# Patient Record
Sex: FEMALE | Race: WHITE | NOT HISPANIC OR LATINO | URBAN - METROPOLITAN AREA
[De-identification: names, ages, dates, MRNs, and addresses within clinical notes are randomized per-mention and may not be internally consistent; named-entity substitution may affect disease eponyms.]

---

## 2017-09-18 ENCOUNTER — INPATIENT (INPATIENT)
Facility: HOSPITAL | Age: 22
LOS: 3 days | Discharge: PSYCHIATRIC OR SUBSTANCE USE FACILITY | End: 2017-09-22
Attending: HOSPITALIST | Admitting: HOSPITALIST
Payer: COMMERCIAL

## 2017-09-18 VITALS
WEIGHT: 178.57 LBS | OXYGEN SATURATION: 100 % | DIASTOLIC BLOOD PRESSURE: 81 MMHG | TEMPERATURE: 98 F | SYSTOLIC BLOOD PRESSURE: 115 MMHG | HEART RATE: 89 BPM | RESPIRATION RATE: 19 BRPM

## 2017-09-18 DIAGNOSIS — Z29.9 ENCOUNTER FOR PROPHYLACTIC MEASURES, UNSPECIFIED: ICD-10-CM

## 2017-09-18 DIAGNOSIS — R11.10 VOMITING, UNSPECIFIED: ICD-10-CM

## 2017-09-18 DIAGNOSIS — F60.3 BORDERLINE PERSONALITY DISORDER: ICD-10-CM

## 2017-09-18 DIAGNOSIS — F31.9 BIPOLAR DISORDER, UNSPECIFIED: ICD-10-CM

## 2017-09-18 DIAGNOSIS — Z90.89 ACQUIRED ABSENCE OF OTHER ORGANS: Chronic | ICD-10-CM

## 2017-09-18 DIAGNOSIS — T39.1X2A POISONING BY 4-AMINOPHENOL DERIVATIVES, INTENTIONAL SELF-HARM, INITIAL ENCOUNTER: ICD-10-CM

## 2017-09-18 DIAGNOSIS — T50.902A POISONING BY UNSPECIFIED DRUGS, MEDICAMENTS AND BIOLOGICAL SUBSTANCES, INTENTIONAL SELF-HARM, INITIAL ENCOUNTER: ICD-10-CM

## 2017-09-18 LAB
ALBUMIN SERPL ELPH-MCNC: 4.5 G/DL — SIGNIFICANT CHANGE UP (ref 3.3–5)
ALP SERPL-CCNC: 66 U/L — SIGNIFICANT CHANGE UP (ref 40–120)
ALT FLD-CCNC: 60 U/L — HIGH (ref 4–33)
AMPHET UR-MCNC: NEGATIVE — SIGNIFICANT CHANGE UP
APAP SERPL-MCNC: 38 UG/ML — HIGH (ref 15–25)
AST SERPL-CCNC: 54 U/L — HIGH (ref 4–32)
BARBITURATES MEASUREMENT: NEGATIVE — SIGNIFICANT CHANGE UP
BARBITURATES UR SCN-MCNC: NEGATIVE — SIGNIFICANT CHANGE UP
BASE EXCESS BLDV CALC-SCNC: -1.3 MMOL/L — SIGNIFICANT CHANGE UP
BASOPHILS # BLD AUTO: 0.06 K/UL — SIGNIFICANT CHANGE UP (ref 0–0.2)
BASOPHILS NFR BLD AUTO: 0.4 % — SIGNIFICANT CHANGE UP (ref 0–2)
BENZODIAZ SERPL-MCNC: NEGATIVE — SIGNIFICANT CHANGE UP
BENZODIAZ UR-MCNC: POSITIVE — SIGNIFICANT CHANGE UP
BILIRUB SERPL-MCNC: 0.6 MG/DL — SIGNIFICANT CHANGE UP (ref 0.2–1.2)
BLOOD GAS VENOUS - CREATININE: 0.79 MG/DL — SIGNIFICANT CHANGE UP (ref 0.5–1.3)
BUN SERPL-MCNC: 13 MG/DL — SIGNIFICANT CHANGE UP (ref 7–23)
CALCIUM SERPL-MCNC: 9.2 MG/DL — SIGNIFICANT CHANGE UP (ref 8.4–10.5)
CANNABINOIDS UR-MCNC: POSITIVE — SIGNIFICANT CHANGE UP
CHLORIDE BLDV-SCNC: 107 MMOL/L — SIGNIFICANT CHANGE UP (ref 96–108)
CHLORIDE SERPL-SCNC: 104 MMOL/L — SIGNIFICANT CHANGE UP (ref 98–107)
CK SERPL-CCNC: 219 U/L — HIGH (ref 25–170)
CO2 SERPL-SCNC: 22 MMOL/L — SIGNIFICANT CHANGE UP (ref 22–31)
COCAINE METAB.OTHER UR-MCNC: NEGATIVE — SIGNIFICANT CHANGE UP
CREAT SERPL-MCNC: 0.91 MG/DL — SIGNIFICANT CHANGE UP (ref 0.5–1.3)
EOSINOPHIL # BLD AUTO: 0 K/UL — SIGNIFICANT CHANGE UP (ref 0–0.5)
EOSINOPHIL NFR BLD AUTO: 0 % — SIGNIFICANT CHANGE UP (ref 0–6)
ETHANOL BLD-MCNC: < 10 MG/DL — SIGNIFICANT CHANGE UP
GAS PNL BLDV: 135 MMOL/L — LOW (ref 136–146)
GLUCOSE BLDV-MCNC: 147 — HIGH (ref 70–99)
GLUCOSE SERPL-MCNC: 144 MG/DL — HIGH (ref 70–99)
HCG SERPL-ACNC: < 5 MIU/ML — SIGNIFICANT CHANGE UP
HCO3 BLDV-SCNC: 22 MMOL/L — SIGNIFICANT CHANGE UP (ref 20–27)
HCT VFR BLD CALC: 44.9 % — SIGNIFICANT CHANGE UP (ref 34.5–45)
HCT VFR BLDV CALC: 48.5 % — HIGH (ref 34.5–45)
HGB BLD-MCNC: 15.2 G/DL — SIGNIFICANT CHANGE UP (ref 11.5–15.5)
HGB BLDV-MCNC: 15.9 G/DL — HIGH (ref 11.5–15.5)
IMM GRANULOCYTES # BLD AUTO: 0.04 # — SIGNIFICANT CHANGE UP
IMM GRANULOCYTES NFR BLD AUTO: 0.3 % — SIGNIFICANT CHANGE UP (ref 0–1.5)
LACTATE BLDV-MCNC: 2 MMOL/L — SIGNIFICANT CHANGE UP (ref 0.5–2)
LYMPHOCYTES # BLD AUTO: 0.94 K/UL — LOW (ref 1–3.3)
LYMPHOCYTES # BLD AUTO: 6.4 % — LOW (ref 13–44)
MCHC RBC-ENTMCNC: 28.1 PG — SIGNIFICANT CHANGE UP (ref 27–34)
MCHC RBC-ENTMCNC: 33.9 % — SIGNIFICANT CHANGE UP (ref 32–36)
MCV RBC AUTO: 83 FL — SIGNIFICANT CHANGE UP (ref 80–100)
METHADONE UR-MCNC: NEGATIVE — SIGNIFICANT CHANGE UP
MONOCYTES # BLD AUTO: 0.5 K/UL — SIGNIFICANT CHANGE UP (ref 0–0.9)
MONOCYTES NFR BLD AUTO: 3.4 % — SIGNIFICANT CHANGE UP (ref 2–14)
NEUTROPHILS # BLD AUTO: 13.12 K/UL — HIGH (ref 1.8–7.4)
NEUTROPHILS NFR BLD AUTO: 89.5 % — HIGH (ref 43–77)
NRBC # FLD: 0 — SIGNIFICANT CHANGE UP
OPIATES UR-MCNC: NEGATIVE — SIGNIFICANT CHANGE UP
OXYCODONE UR-MCNC: NEGATIVE — SIGNIFICANT CHANGE UP
PCO2 BLDV: 45 MMHG — SIGNIFICANT CHANGE UP (ref 41–51)
PCP UR-MCNC: NEGATIVE — SIGNIFICANT CHANGE UP
PH BLDV: 7.34 PH — SIGNIFICANT CHANGE UP (ref 7.32–7.43)
PLATELET # BLD AUTO: 279 K/UL — SIGNIFICANT CHANGE UP (ref 150–400)
PMV BLD: 10.9 FL — SIGNIFICANT CHANGE UP (ref 7–13)
PO2 BLDV: 34 MMHG — LOW (ref 35–40)
POTASSIUM BLDV-SCNC: 5.2 MMOL/L — HIGH (ref 3.4–4.5)
POTASSIUM SERPL-MCNC: 3.8 MMOL/L — SIGNIFICANT CHANGE UP (ref 3.5–5.3)
POTASSIUM SERPL-SCNC: 3.8 MMOL/L — SIGNIFICANT CHANGE UP (ref 3.5–5.3)
PROT SERPL-MCNC: 8.1 G/DL — SIGNIFICANT CHANGE UP (ref 6–8.3)
RBC # BLD: 5.41 M/UL — HIGH (ref 3.8–5.2)
RBC # FLD: 13 % — SIGNIFICANT CHANGE UP (ref 10.3–14.5)
SALICYLATES SERPL-MCNC: < 5 MG/DL — LOW (ref 15–30)
SAO2 % BLDV: 67 % — SIGNIFICANT CHANGE UP (ref 60–85)
SODIUM SERPL-SCNC: 142 MMOL/L — SIGNIFICANT CHANGE UP (ref 135–145)
WBC # BLD: 14.66 K/UL — HIGH (ref 3.8–10.5)
WBC # FLD AUTO: 14.66 K/UL — HIGH (ref 3.8–10.5)

## 2017-09-18 PROCEDURE — 99223 1ST HOSP IP/OBS HIGH 75: CPT | Mod: AI,GC

## 2017-09-18 RX ORDER — SODIUM CHLORIDE 9 MG/ML
1000 INJECTION INTRAMUSCULAR; INTRAVENOUS; SUBCUTANEOUS ONCE
Qty: 0 | Refills: 0 | Status: COMPLETED | OUTPATIENT
Start: 2017-09-18 | End: 2017-09-18

## 2017-09-18 RX ORDER — INFLUENZA VIRUS VACCINE 15; 15; 15; 15 UG/.5ML; UG/.5ML; UG/.5ML; UG/.5ML
0.5 SUSPENSION INTRAMUSCULAR ONCE
Qty: 0 | Refills: 0 | Status: DISCONTINUED | OUTPATIENT
Start: 2017-09-18 | End: 2017-09-22

## 2017-09-18 RX ORDER — SODIUM CHLORIDE 9 MG/ML
1000 INJECTION INTRAMUSCULAR; INTRAVENOUS; SUBCUTANEOUS
Qty: 0 | Refills: 0 | Status: COMPLETED | OUTPATIENT
Start: 2017-09-18 | End: 2017-09-19

## 2017-09-18 RX ORDER — ACETYLCYSTEINE 200 MG/ML
4.1 VIAL (ML) MISCELLANEOUS ONCE
Qty: 0 | Refills: 0 | Status: COMPLETED | OUTPATIENT
Start: 2017-09-18 | End: 2017-09-18

## 2017-09-18 RX ORDER — FAMOTIDINE 10 MG/ML
20 INJECTION INTRAVENOUS ONCE
Qty: 0 | Refills: 0 | Status: COMPLETED | OUTPATIENT
Start: 2017-09-18 | End: 2017-09-18

## 2017-09-18 RX ORDER — CLONAZEPAM 1 MG
1 TABLET ORAL
Qty: 0 | Refills: 0 | Status: DISCONTINUED | OUTPATIENT
Start: 2017-09-18 | End: 2017-09-22

## 2017-09-18 RX ORDER — ONDANSETRON 8 MG/1
4 TABLET, FILM COATED ORAL ONCE
Qty: 0 | Refills: 0 | Status: COMPLETED | OUTPATIENT
Start: 2017-09-18 | End: 2017-09-18

## 2017-09-18 RX ORDER — ACETYLCYSTEINE 200 MG/ML
12 VIAL (ML) MISCELLANEOUS ONCE
Qty: 0 | Refills: 0 | Status: COMPLETED | OUTPATIENT
Start: 2017-09-18 | End: 2017-09-18

## 2017-09-18 RX ORDER — PANTOPRAZOLE SODIUM 20 MG/1
40 TABLET, DELAYED RELEASE ORAL DAILY
Qty: 0 | Refills: 0 | Status: DISCONTINUED | OUTPATIENT
Start: 2017-09-18 | End: 2017-09-22

## 2017-09-18 RX ORDER — ONDANSETRON 8 MG/1
4 TABLET, FILM COATED ORAL EVERY 8 HOURS
Qty: 0 | Refills: 0 | Status: DISCONTINUED | OUTPATIENT
Start: 2017-09-18 | End: 2017-09-22

## 2017-09-18 RX ORDER — ACETYLCYSTEINE 200 MG/ML
8 VIAL (ML) MISCELLANEOUS ONCE
Qty: 0 | Refills: 0 | Status: COMPLETED | OUTPATIENT
Start: 2017-09-18 | End: 2017-09-18

## 2017-09-18 RX ADMIN — ONDANSETRON 4 MILLIGRAM(S): 8 TABLET, FILM COATED ORAL at 16:56

## 2017-09-18 RX ADMIN — Medication 30 MILLILITER(S): at 21:18

## 2017-09-18 RX ADMIN — Medication 310 GRAM(S): at 17:17

## 2017-09-18 RX ADMIN — FAMOTIDINE 20 MILLIGRAM(S): 10 INJECTION INTRAVENOUS at 16:56

## 2017-09-18 RX ADMIN — Medication 130.13 GRAM(S): at 18:15

## 2017-09-18 RX ADMIN — SODIUM CHLORIDE 1000 MILLILITER(S): 9 INJECTION INTRAMUSCULAR; INTRAVENOUS; SUBCUTANEOUS at 16:55

## 2017-09-18 RX ADMIN — ONDANSETRON 4 MILLIGRAM(S): 8 TABLET, FILM COATED ORAL at 22:51

## 2017-09-18 RX ADMIN — Medication 65 GRAM(S): at 22:45

## 2017-09-18 NOTE — ED ADULT NURSE NOTE - CHIEF COMPLAINT QUOTE
Hx of borderline personality disorder. Pt states she took ~60 Tylenol PM last night at 7:30pm. Pt has been vomiting. Parents state she is more lethargic than normal, but awake and alert in triage. Hx of suicide attempts in past and has had an increase in depression. Hx of using prescription drugs, family wants pt to go to rehab but will need detox first.

## 2017-09-18 NOTE — ED PROVIDER NOTE - PROGRESS NOTE DETAILS
Mac Barrett: spoke with ED BH attending, will consult on patient. ISAURA Barrett: pt doing well, no acute complaints. spoke with tox fellow and attending rain-- advised NAC x 3 doses and trend LFTS. will follow. admitted to hospitalist.

## 2017-09-18 NOTE — CONSULT NOTE ADULT - PROBLEM SELECTOR RECOMMENDATION 9
1) Intentional acute APAP OD with level above the [place]-Jose Carlos Nomogram line. Tx with IV NAC, weight based dosing: 1) Intentional acute APAP OD with level above the [place]-Jose Carlos Nomogram line. Tx with IV NAC, weight based dosing:  -150mg/kg IV over 1 hr  -50mg/kg IV over 4 hrs  -100mg/kg over 16 hrs  2) Check LFTs, coags, lactate  3) Repeat LFTs, APAP at hour 20 and please page with results  4) No s/sx of anticholinergic toxicity or serotonin syndrome  5) Psych consult  6) Will follow. Please call [phone] with consult

## 2017-09-18 NOTE — H&P ADULT - NSHPREVIEWOFSYSTEMS_GEN_ALL_CORE
REVIEW OF SYSTEMS  General:	No fever, no weight loss  Skin/Breast: No rash  Ophthalmologic: Yellow spots in vision  ENMT: Throat discomfort	  Respiratory and Thorax: Non-productive cough  Cardiovascular: Central chest pain, mild, burning, no radiation to arm or jaw, non-exertional	  Gastrointestinal: NB/NB emesis, one episode of red streaked emesis in ED	  Genitourinary: No dysuria/hematuria		  Neurological: +Headache	  Psychiatric: -SI/AH/VH

## 2017-09-18 NOTE — ED PROVIDER NOTE - CHPI ED SYMPTOMS NEG
no change in level of consciousness/no paranoia/no hallucinations/no homicidal/no weakness/no confusion/no disorientation/no weight loss/no agitation

## 2017-09-18 NOTE — H&P ADULT - NSHPLABSRESULTS_GEN_ALL_CORE
Complete Blood Count + Automated Diff (09.18.17 @ 17:03)    Nucleated RBC #: 0    WBC Count: 14.66 K/uL    RBC Count: 5.41 M/uL    Hemoglobin: 15.2 g/dL    Hematocrit: 44.9 %    Mean Cell Volume: 83.0 fL    Mean Cell Hemoglobin: 28.1 pg    Mean Cell Hemoglobin Conc: 33.9 %    Red Cell Distrib Width: 13.0 %    Platelet Count - Automated: 279 K/uL    MPV: 10.9 fl    Auto Neutrophil #: 13.12 K/uL    Auto Lymphocyte #: 0.94 K/uL    Auto Monocyte #: 0.50 K/uL    Auto Eosinophil #: 0.00 K/uL    Auto Basophil #: 0.06 K/uL    Auto Immature Granulocyte #: 0.04    Auto Neutrophil %: 89.5 %    Auto Lymphocyte %: 6.4 %    Auto Monocyte %: 3.4 %    Auto Eosinophil %: 0.0 %    Auto Basophil %: 0.4 %    Auto Immature Granulocyte %: 0.3    Comprehensive Metabolic Panel (09.18.17 @ 17:03)    Sodium, Serum: 142 mmol/L    Potassium, Serum: 3.8 mmol/L    Chloride, Serum: 104 mmol/L    Carbon Dioxide, Serum: 22 mmol/L    Blood Urea Nitrogen, Serum: 13 mg/dL    Creatinine, Serum: 0.91 mg/dL    Glucose, Serum: 144 mg/dL    Calcium, Total Serum: 9.2 mg/dL    Protein Total, Serum: 8.1 g/dL    Albumin, Serum: 4.5 g/dL    Bilirubin Total, Serum: 0.6 mg/dL    Alkaline Phosphatase, Serum: 66    Aspartate Aminotransferase (AST/SGOT): 54 u/L    Alanine Aminotransferase (ALT/SGPT): 60 u/L    Acetaminophen Level, Serum: 38.o

## 2017-09-18 NOTE — ED PROVIDER NOTE - OBJECTIVE STATEMENT
23 yo F pmhx of [facility] personality, depression, anger, here for tylenol OD. Pt states last night she took 60-70 tablets of tylenol PM. Began vomiting after and reports multiple episodes of emesis overnight and this morning, last being 10 minute PTA. Also reports mild epigastric pain, diarrhea. All non bloody. Pt states when she took the medication she did it with the intention to kill herself, but now she states she regrets it and does not want to kill herself. Reports that she has support with her girlfriend and her dogs at home as positive value that don't make her want to kill herself. Denies HI. Reports she used to take lithium which helped but stopped 1 mo ago after switching psychiatrists and is now only on klonopin which she feels doesn't work as well. Denies fever chills CP SOB weakness HA dizziness rash.

## 2017-09-18 NOTE — H&P ADULT - NSHPSOCIALHISTORY_GEN_ALL_CORE
Lives with mother in [place], feels safe in home. 7 pack/year current smoker, denies EtOH/IVDU, endorses occassional marijuana.

## 2017-09-18 NOTE — H&P ADULT - NSHPPHYSICALEXAM_GEN_ALL_CORE
VITAL SIGNS:  T(C): 36.7 (09-18-17 @ 16:38), Max: 36.7 (09-18-17 @ 15:56)  T(F): 98.1 (09-18-17 @ 16:38), Max: 98.1 (09-18-17 @ 16:38)  HR: 68 (09-18-17 @ 18:44) (68 - 89)  BP: 111/61 (09-18-17 @ 18:44) (111/61 - 115/81)  BP(mean): --  RR: 17 (09-18-17 @ 18:44) (16 - 19)  SpO2: 100% (09-18-17 @ 18:44) (99% - 100%)  Wt(kg): --    PHYSICAL EXAM:    Constitutional: WDWN resting comfortably in bed; NAD  Head: NC/AT  Eyes: PERRL  ENT: Erythematous pharynx,  uvula midline, MMM. no nasal discharge  Respiratory: CTA B/L; no W/R/R, no retractions  Cardiac: +S1/S2; RRR; no M/R/G; PMI non-displaced  Gastrointestinal: soft, NT/ND; no rebound or guarding; +BSx4  Extremities: WWP; no peripheral edema  Vascular: 2+ radial pulses B/L  Dermatologic: skin warm, dry and intact; no rashes  Neurologic: AAOx3; CNII-XII grossly intact; moves all extremity well, sensation intact and symmetric  Psychiatric: Mood "garbage". Affect dysthymic. Speech regular rate. Content of thought: Denies SI/AH/VH. Flow of thought is logical.

## 2017-09-18 NOTE — H&P ADULT - ASSESSMENT
21 YO F with history of borderline personality disorder and bipolar disorder presents after intentional tylenol overdose. Now denying SI. Tylenol level of 38 at approximately 20 hours is well above treatment cutoff on Tylenol nomogram; NAC was initiated in ED.

## 2017-09-18 NOTE — ED PROVIDER NOTE - ATTENDING CONTRIBUTION TO CARE
22 year old presents w overdose of tylenol pm.  Took 60 tablets.  Pt has had multiple suicide attempts.   Pt was given NAC IV and admitted for tylenol over dose.  PT lungs cta b/l abd nt/nd neuro nonfocal exam  Impression Overdose, Depression.

## 2017-09-18 NOTE — H&P ADULT - HISTORY OF PRESENT ILLNESS
21 YO F with history of [place], bipolar currently treated with clonazepam presents with report of tylenol ingestion. She reports that at approximately 20:30 yesterday evening (20 hours prior to presentation) she finished a bottle of tylenol that contained approximately 100 pills. She has had several life stressors recently but had been feeling well until yesterday when a close friend was arrested which she reports 21 YO F with history of [place] [employer], bipolar currently treated with clonazepam presents with report of tylenol ingestion. She reports that at approximately 20:30 yesterday evening (20 hours prior to presentation) she finished a bottle of tylenol that contained approximately 100 pills. She has had several life stressors recently but had been feeling well until yesterday when a close friend was arrested which she reports precipitated her suicide attempt. Following her ingestion she was initially asymptomatic but began experiencing RUQ pain and N/V this morning. The emesis was initially nonbloody-nonbilious but the most recent episode has some faint red streaks. She also reports non-bloody diarrhea and throat discomfort following this ingestion. At the time of interview she denied active or passive SI and hallucinations. She denies any other toxic ingestion.    In the ED vitals were: 36.7 89 115/81 19 100%, a tylenol level was drawn at 17:00 with a level of 38, remainder of toxicology work up was negative. She was seen by toxicology who initiated NAC.

## 2017-09-18 NOTE — H&P ADULT - PROBLEM SELECTOR PLAN 2
Likely 2/2 tylenol ingestion, although patient reports some blood tinge to emesis this was only on episode and after extensive vomiting. Patient remains VS stable with normal Hgb and only mildly elevated LFTs.  - 100 ml/hr NS for 12 hr overnight  - Protonix 40 mg PO daily, zofran 4 mg PO daily PRN Likely 2/2 tylenol ingestion, although patient reports some blood tinge to emesis this was only on episode and after extensive vomiting. Patient remains VS stable with normal Hgb and only mildly elevated LFTs.  - 100 ml/hr NS for 12 hr overnight  - Protonix 40 mg PO daily, zofran 4 mg PO q8 PRN

## 2017-09-18 NOTE — ED ADULT TRIAGE NOTE - CHIEF COMPLAINT QUOTE
Hx of borderline personality disorder. Pt states she took ~60 Tylenol PM last night at 7:30pm. Pt has been vomiting. Parents state she is more letahrgic than normal. Hx of suicide attempts in past. Family wants pt to go to rehab but will need detox first. Hx of borderline personality disorder. Pt states she took ~60 Tylenol PM last night at 7:30pm. Pt has been vomiting. Parents state she is more lethargic than normal, but awake and alert in triage. Hx of suicide attempts in past and has had an increase in depression. Hx of using prescription drugs, family wants pt to go to rehab but will need detox first.

## 2017-09-18 NOTE — ED ADULT NURSE NOTE - OBJECTIVE STATEMENT
Pt received to rm #7.  appears generally weak. pale.  oriented x 3.  calm and cooperative at this time. admits to taking a large amt (about 60-70 pills)  of Tylenol pm pills last night around 9pm to hurt herself.  seen and eval'd by md.  SL placed. labs sent.  VSS.  ekg called.  1:1 observation requested. pts family at bedside at this time. resting.  await further orders.  report to primary rn.  add: multiple old (4 years old) cut marks/scars noted up and down  left arm. Pt received to rm #7.  appears generally weak. pale.  oriented x 3.  calm and cooperative at this time. admits to taking a large amt (about 60-70 pills)  of Tylenol pm pills last night around 9pm to hurt herself.  seen and eval'd by md.  SL placed. labs sent.  VSS.  ekg called.  1:1 observation initiated. pts family at bedside at this time. resting.  await further orders.  report to primary rn.  add: multiple old (4 years old) cut marks/scars noted up and down  left arm. Pt received to rm #7.  appears generally weak. pale.  oriented x 3.  calm and cooperative at this time. admits to taking a large amt (about 60-70 pills)  of Tylenol pm pills last night around 9pm to hurt herself.  seen and eval'd by md.  SL placed. labs sent.  VSS.  ekg called.  1:1 observation initiated. pts family at bedside at this time. resting.  await further orders.  report to primary rn.  add: multiple old (4 years old) cut marks/scars noted up and down  left arm.  Patient has MD at bedside. Pt is calm and has a 1:1 watching her. She has been assigned a room and report has been given. Pt is awaiting transport to floor.     CHENCHO Robertson

## 2017-09-18 NOTE — ED ADULT NURSE REASSESSMENT NOTE - NS ED NURSE REASSESS COMMENT FT1
Report received from CHENCHO Fletcher. Pt. is resting with family and PCA at bedside. Pt. remains on constant observation. Safe environment maintained. Pt. c/o epigastric burning pain. Medicine team paged. Awaiting response. ED attending at bedside at present. Report received from CHENCHO Fletcher. Report given by CHENCHO Jamison. Pt. is awaiting transport. Pt. is resting with family and PCA at bedside. Pt. remains on constant observation. Safe environment maintained. Pt. c/o epigastric burning pain. Medicine team paged. Awaiting response. ED attending at bedside at present.

## 2017-09-18 NOTE — CONSULT NOTE ADULT - SUBJECTIVE AND OBJECTIVE BOX
MEDICAL TOXICOLOGY CONSULT    HPI:  22F h/o borderline personality d/o, depression, on clonazepam, was on lithium (but dc'ed 1.5 mos ago) p/w intentional OD of "a handful" of Tylenol PM at 8.30-9pm last night. Pt did not tell her mother how much she took until today when she had multiple episodes of NBNB emesis. Also c/o RUQ pain, no fevers or chills, no constipation or diarrhea. +suicidal. No cp or sob. Able to urinate.    ONSET / TIME of exposure(s):  last night    QUANTITY of exposure(s): unknown amount of APAP PM    ROUTE of exposure:  x (INGESTION,     CONTEXT of exposure: depression    ASSOCIATED symptoms: N/V/abdo pain    PAST MEDICAL & SURGICAL HISTORY:  borderline personality, depression      MEDICATION HISTORY:  clonazepam, lithium      RECREATIONAL / ETHANOL / SUPPLEMENT USE: +tobacco, +THC    SOCIAL Hx:  lives with mother, works as a [employer] worker    FAMILY HISTORY: NC      REVIEW OF SYSTEMS:   _____unable to perform due to intoxication, dementia, or illness  General:  no fever, chills, malaise, change in weight or fatigue  Eyes:  no blurry vision, double vision, or diminished acuity  ENT:  no tinnitus, decreased acuity, epistaxis, sore throat, dysphagia  Cardiac: no chest pain, syncope, or palpitations  Lung:  no cough, shortness of breath, stridor, or wheezing  GI:  +abdo pain, nausea, vomiting  Genitourinary: no dysuria, hematuria, or incontinence  Ortho: no joint pain, swelling, myalgias, atrophy, or spasm  Skin: no rash, lesions, or pruritis  Neuro:  no headache, weakness/numbness, ataxia, change in speech, dizziness, tremor, or seizure  Psych: _x___depression, no____anxiety, _no___mania, __x___suicidal, ____homicidal  Endocrine: no polydypsia, polyuria, heat/cold intoleranceno  Hematologic: no bleeding, bruising, petechiae, or adenopathy  Immune:  no rhinitis, atopy, immunocompromise, HIV, or cancer    PHYSICAL EXAM  Vital Signs Last 24 Hrs  T(C): 36.7 (18 Sep 2017 16:38), Max: 36.7 (18 Sep 2017 15:56)  T(F): 98.1 (18 Sep 2017 16:38), Max: 98.1 (18 Sep 2017 16:38)  HR: 77 (18 Sep 2017 16:38) (77 - 89)  BP: 111/64 (18 Sep 2017 16:38) (111/64 - 115/81)  BP(mean): --  RR: 16 (18 Sep 2017 16:38) (16 - 19)  SpO2: 99% (18 Sep 2017 16:38) (99% - 100%)  General:    Head:  normocephalic & atraumatic  Eyes:  extra-occular movement is intact  Pupils:  2___ mm, symmetric, reactive to light  Ear, nose, throat:  mucosa is __moist__, dentition is _nml__  Neck:  supple  Respiratory:  __nml__ effort, clear to auscultation bilaterally, no rales/rhonchi/wheezing  Cardiac:  rate is__nml__, normal rhythm____, no rubs/murmurs/gallops  Abdomen:  +epigastric and RUQ ttp, neg murphys  :  deferred  Skin:  dry/diaphoretic, pink/flushed, turgor is_____  Neurologic:  _no__Clonus, _no__ Tremor, Reflexes are____nml_, extremities are supple, cranial nerves II-XII intact, Level of consciousness is nml____  Psychiatric:  Insight is__impaired__, alert and oriented x__3__, Memory is ___nml__, Affect is_____depressed    SIGNIFICANT LABORATORY STUDIES:                        15.2   14.66 )-----------( 279      ( 18 Sep 2017 17:03 )             44.9       18    142  |  104  |  13  ----------------------------<  144<H>  3.8   |  22  |  0.91    Ca    9.2      18 Sep 2017 17:03    TPro  8.1  /  Alb  4.5  /  TBili  0.6  /  DBili  x   /  AST  54<H>  /  ALT  60<H>  /  AlkPhos  66  18              Anion Gap: --  @ 17:03  CK: --  @ 17:03  Troponin:  --   @ 17:03  Pro-BNP:  --   @ 17:03  VB   @ 17:03  Carboxyhemoglobin %:  --   @ :03  Methemoglobin %:  --   @ 17:03  Osmolality Serum:  --   @ 17:03  Aspirin Level: < 5.0<L>   @ 17:03  Acetaminophen Level:  38.0<H>   @ 17:03  Ethanol Level:  < 10   @ 17:03  Digoxin Level:  --   @ 17:03  Phenytoin Level:  --   @ 17:03  Carbamazepine level:  --   @ 17:03  Lamotrigine level:  --   @ 17:03      ANION Gap  OSM Gap  CK  ASA  APAP  Ethanol  VBG  Carboxy/Met-hemoglobin %  Lactate  ___drug level    ECG:  rate, rhythm, NJ, QRS, QTc    RADIOLOGIC STUDIES

## 2017-09-19 DIAGNOSIS — G40.909 EPILEPSY, UNSPECIFIED, NOT INTRACTABLE, WITHOUT STATUS EPILEPTICUS: ICD-10-CM

## 2017-09-19 LAB
ALBUMIN SERPL ELPH-MCNC: 3.5 G/DL — SIGNIFICANT CHANGE UP (ref 3.3–5)
ALBUMIN SERPL ELPH-MCNC: 3.6 G/DL — SIGNIFICANT CHANGE UP (ref 3.3–5)
ALP SERPL-CCNC: 55 U/L — SIGNIFICANT CHANGE UP (ref 40–120)
ALP SERPL-CCNC: 55 U/L — SIGNIFICANT CHANGE UP (ref 40–120)
ALT FLD-CCNC: 167 U/L — HIGH (ref 4–33)
ALT FLD-CCNC: 822 U/L — HIGH (ref 4–33)
APAP SERPL-MCNC: < 15 UG/ML — LOW (ref 15–25)
APTT BLD: 29.6 SEC — SIGNIFICANT CHANGE UP (ref 27.5–37.4)
AST SERPL-CCNC: 133 U/L — HIGH (ref 4–32)
AST SERPL-CCNC: 818 U/L — HIGH (ref 4–32)
BASOPHILS # BLD AUTO: 0.1 K/UL — SIGNIFICANT CHANGE UP (ref 0–0.2)
BASOPHILS NFR BLD AUTO: 1.2 % — SIGNIFICANT CHANGE UP (ref 0–2)
BILIRUB DIRECT SERPL-MCNC: 0.1 MG/DL — SIGNIFICANT CHANGE UP (ref 0.1–0.2)
BILIRUB SERPL-MCNC: 0.4 MG/DL — SIGNIFICANT CHANGE UP (ref 0.2–1.2)
BILIRUB SERPL-MCNC: 0.5 MG/DL — SIGNIFICANT CHANGE UP (ref 0.2–1.2)
BUN SERPL-MCNC: 8 MG/DL — SIGNIFICANT CHANGE UP (ref 7–23)
CALCIUM SERPL-MCNC: 8.2 MG/DL — LOW (ref 8.4–10.5)
CHLORIDE SERPL-SCNC: 105 MMOL/L — SIGNIFICANT CHANGE UP (ref 98–107)
CO2 SERPL-SCNC: 20 MMOL/L — LOW (ref 22–31)
CREAT SERPL-MCNC: 0.7 MG/DL — SIGNIFICANT CHANGE UP (ref 0.5–1.3)
EOSINOPHIL # BLD AUTO: 0.11 K/UL — SIGNIFICANT CHANGE UP (ref 0–0.5)
EOSINOPHIL NFR BLD AUTO: 1.3 % — SIGNIFICANT CHANGE UP (ref 0–6)
GLUCOSE SERPL-MCNC: 110 MG/DL — HIGH (ref 70–99)
HCT VFR BLD CALC: 40 % — SIGNIFICANT CHANGE UP (ref 34.5–45)
HGB BLD-MCNC: 13.5 G/DL — SIGNIFICANT CHANGE UP (ref 11.5–15.5)
IMM GRANULOCYTES # BLD AUTO: 0.04 # — SIGNIFICANT CHANGE UP
IMM GRANULOCYTES NFR BLD AUTO: 0.5 % — SIGNIFICANT CHANGE UP (ref 0–1.5)
INR BLD: 1.65 — HIGH (ref 0.88–1.17)
INR BLD: 1.74 — HIGH (ref 0.88–1.17)
LACTATE SERPL-SCNC: 1.1 MMOL/L — SIGNIFICANT CHANGE UP (ref 0.5–2)
LYMPHOCYTES # BLD AUTO: 0.96 K/UL — LOW (ref 1–3.3)
LYMPHOCYTES # BLD AUTO: 11.3 % — LOW (ref 13–44)
MAGNESIUM SERPL-MCNC: 2.1 MG/DL — SIGNIFICANT CHANGE UP (ref 1.6–2.6)
MCHC RBC-ENTMCNC: 28 PG — SIGNIFICANT CHANGE UP (ref 27–34)
MCHC RBC-ENTMCNC: 33.8 % — SIGNIFICANT CHANGE UP (ref 32–36)
MCV RBC AUTO: 83 FL — SIGNIFICANT CHANGE UP (ref 80–100)
MONOCYTES # BLD AUTO: 0.35 K/UL — SIGNIFICANT CHANGE UP (ref 0–0.9)
MONOCYTES NFR BLD AUTO: 4.1 % — SIGNIFICANT CHANGE UP (ref 2–14)
NEUTROPHILS # BLD AUTO: 6.93 K/UL — SIGNIFICANT CHANGE UP (ref 1.8–7.4)
NEUTROPHILS NFR BLD AUTO: 81.6 % — HIGH (ref 43–77)
NRBC # FLD: 0 — SIGNIFICANT CHANGE UP
PHOSPHATE SERPL-MCNC: 1.9 MG/DL — LOW (ref 2.5–4.5)
PLATELET # BLD AUTO: 216 K/UL — SIGNIFICANT CHANGE UP (ref 150–400)
PMV BLD: 11.2 FL — SIGNIFICANT CHANGE UP (ref 7–13)
POTASSIUM SERPL-MCNC: 3.2 MMOL/L — LOW (ref 3.5–5.3)
POTASSIUM SERPL-SCNC: 3.2 MMOL/L — LOW (ref 3.5–5.3)
PROT SERPL-MCNC: 6.3 G/DL — SIGNIFICANT CHANGE UP (ref 6–8.3)
PROT SERPL-MCNC: 6.5 G/DL — SIGNIFICANT CHANGE UP (ref 6–8.3)
PROTHROM AB SERPL-ACNC: 18.7 SEC — HIGH (ref 9.8–13.1)
PROTHROM AB SERPL-ACNC: 19.7 SEC — HIGH (ref 9.8–13.1)
RBC # BLD: 4.82 M/UL — SIGNIFICANT CHANGE UP (ref 3.8–5.2)
RBC # FLD: 13.1 % — SIGNIFICANT CHANGE UP (ref 10.3–14.5)
SODIUM SERPL-SCNC: 140 MMOL/L — SIGNIFICANT CHANGE UP (ref 135–145)
WBC # BLD: 8.49 K/UL — SIGNIFICANT CHANGE UP (ref 3.8–10.5)
WBC # FLD AUTO: 8.49 K/UL — SIGNIFICANT CHANGE UP (ref 3.8–10.5)

## 2017-09-19 PROCEDURE — 99233 SBSQ HOSP IP/OBS HIGH 50: CPT | Mod: GC

## 2017-09-19 PROCEDURE — 99223 1ST HOSP IP/OBS HIGH 75: CPT

## 2017-09-19 PROCEDURE — 70450 CT HEAD/BRAIN W/O DYE: CPT | Mod: 26

## 2017-09-19 RX ORDER — SODIUM CHLORIDE 9 MG/ML
1000 INJECTION INTRAMUSCULAR; INTRAVENOUS; SUBCUTANEOUS
Qty: 0 | Refills: 0 | Status: DISCONTINUED | OUTPATIENT
Start: 2017-09-19 | End: 2017-09-22

## 2017-09-19 RX ORDER — NICOTINE POLACRILEX 2 MG
1 GUM BUCCAL DAILY
Qty: 0 | Refills: 0 | Status: DISCONTINUED | OUTPATIENT
Start: 2017-09-19 | End: 2017-09-22

## 2017-09-19 RX ORDER — CLONAZEPAM 1 MG
1 TABLET ORAL ONCE
Qty: 0 | Refills: 0 | Status: COMPLETED | OUTPATIENT
Start: 2017-09-19 | End: 2017-09-26

## 2017-09-19 RX ORDER — METOCLOPRAMIDE HCL 10 MG
5 TABLET ORAL EVERY 8 HOURS
Qty: 0 | Refills: 0 | Status: DISCONTINUED | OUTPATIENT
Start: 2017-09-19 | End: 2017-09-22

## 2017-09-19 RX ORDER — SODIUM,POTASSIUM PHOSPHATES 278-250MG
1 POWDER IN PACKET (EA) ORAL
Qty: 0 | Refills: 0 | Status: COMPLETED | OUTPATIENT
Start: 2017-09-19 | End: 2017-09-20

## 2017-09-19 RX ORDER — CLONAZEPAM 1 MG
1 TABLET ORAL ONCE
Qty: 0 | Refills: 0 | Status: DISCONTINUED | OUTPATIENT
Start: 2017-09-19 | End: 2017-09-19

## 2017-09-19 RX ORDER — ACETYLCYSTEINE 200 MG/ML
8 VIAL (ML) MISCELLANEOUS ONCE
Qty: 0 | Refills: 0 | Status: COMPLETED | OUTPATIENT
Start: 2017-09-19 | End: 2017-09-19

## 2017-09-19 RX ORDER — POTASSIUM CHLORIDE 20 MEQ
20 PACKET (EA) ORAL ONCE
Qty: 0 | Refills: 0 | Status: COMPLETED | OUTPATIENT
Start: 2017-09-19 | End: 2017-09-19

## 2017-09-19 RX ORDER — ONDANSETRON 8 MG/1
4 TABLET, FILM COATED ORAL EVERY 6 HOURS
Qty: 0 | Refills: 0 | Status: DISCONTINUED | OUTPATIENT
Start: 2017-09-19 | End: 2017-09-22

## 2017-09-19 RX ADMIN — SODIUM CHLORIDE 100 MILLILITER(S): 9 INJECTION INTRAMUSCULAR; INTRAVENOUS; SUBCUTANEOUS at 11:48

## 2017-09-19 RX ADMIN — Medication 5 MILLIGRAM(S): at 21:29

## 2017-09-19 RX ADMIN — ONDANSETRON 4 MILLIGRAM(S): 8 TABLET, FILM COATED ORAL at 12:06

## 2017-09-19 RX ADMIN — ONDANSETRON 4 MILLIGRAM(S): 8 TABLET, FILM COATED ORAL at 18:11

## 2017-09-19 RX ADMIN — Medication 65 GRAM(S): at 16:22

## 2017-09-19 RX ADMIN — SODIUM CHLORIDE 125 MILLILITER(S): 9 INJECTION INTRAMUSCULAR; INTRAVENOUS; SUBCUTANEOUS at 16:22

## 2017-09-19 RX ADMIN — Medication 1 MILLIGRAM(S): at 21:36

## 2017-09-19 RX ADMIN — Medication 1 PATCH: at 12:06

## 2017-09-19 RX ADMIN — Medication 1 TABLET(S): at 21:33

## 2017-09-19 RX ADMIN — Medication 30 MILLILITER(S): at 16:19

## 2017-09-19 NOTE — PROGRESS NOTE ADULT - SUBJECTIVE AND OBJECTIVE BOX
ACCEPT NOTE    Patient is a 22y old  Female who presents with a chief complaint of Tylenol Ingestion (18 Sep 2017 19:45)      SUBJECTIVE / OVERNIGHT EVENTS:    HPI:   21 YO F with history of b[place], bipolar currently treated with clonazepam presents with report of tylenol ingestion. She reports that at approximately 20:30 yesterday evening (20 hours prior to presentation) she finished a bottle of tylenol that contained approximately 100 pills. She has had several life stressors recently but had been feeling well until yesterday when a close friend was arrested which she reports precipitated her suicide attempt. Following her ingestion she was initially asymptomatic but began experiencing RUQ pain and N/V this morning. The emesis was initially nonbloody-nonbilious but the most recent episode has some faint red streaks. She also reports non-bloody diarrhea and throat discomfort following this ingestion. At the time of interview she denied active or passive SI and hallucinations. She denies any other toxic ingestion.    In the ED vitals were: 36.7 89 115/81 19 100%, a tylenol level was drawn at 17:00 with a level of 38, remainder of toxicology work up was negative. She was seen by toxicology who initiated NAC.       Interval history  No acute events overnight. Endorsing RUQ and epigastric abdominal pain. No further nausea or vomiting episodes. Reports some regret over incident but otherwise without suicidal ideation.       MEDICATIONS  (STANDING):  sodium chloride 0.9%. 1000 milliLiter(s) (100 mL/Hr) IV Continuous <Continuous>  pantoprazole    Tablet 40 milliGRAM(s) Oral daily  clonazePAM Tablet 1 milliGRAM(s) Oral two times a day  influenza   Vaccine 0.5 milliLiter(s) IntraMuscular once    MEDICATIONS  (PRN):  ondansetron    Tablet 4 milliGRAM(s) Oral every 8 hours PRN Nausea and/or Vomiting  aluminum hydroxide/magnesium hydroxide/simethicone Suspension 30 milliLiter(s) Oral every 6 hours PRN Dyspepsia        CAPILLARY BLOOD GLUCOSE        I&O's Summary    Vital Signs Last 24 Hrs  T(C): 36.8 (19 Sep 2017 05:03), Max: 36.8 (19 Sep 2017 05:03)  T(F): 98.3 (19 Sep 2017 05:03), Max: 98.3 (19 Sep 2017 05:03)  HR: 62 (19 Sep 2017 05:03) (59 - 89)  BP: 101/59 (19 Sep 2017 05:03) (98/56 - 115/81)  BP(mean): --  RR: 16 (19 Sep 2017 05:03) (16 - 19)  SpO2: 100% (19 Sep 2017 05:03) (97% - 100%)      PHYSICAL EXAM:  GENERAL: NAD, well-developed, resting comfortably  HEAD:  Atraumatic, Normocephalic  EYES: EOMI, PERRLA, no icterus  NECK: Supple, No JVD  CHEST/LUNG: Clear to auscultation bilaterally; No wheeze  HEART: Regular rate and rhythm; No murmurs, rubs, or gallops  ABDOMEN: Soft, Mild tenderness in epigastric and RUQ pain. Nondistended; Bowel sounds present  EXTREMITIES:  2+ Peripheral Pulses, No clubbing, cyanosis, or edema  PSYCH: AAOx3  NEUROLOGY: non-focal  SKIN: No rashes or lesions    LABS:                        13.5   8.49  )-----------( 216      ( 19 Sep 2017 06:00 )             40.0     09-19    140  |  105  |  8   ----------------------------<  110<H>  3.2<L>   |  20<L>  |  0.70    Ca    8.2<L>      19 Sep 2017 06:00  Phos  1.9     09-19  Mg     2.1     09-19    TPro  6.3  /  Alb  3.5  /  TBili  0.5  /  DBili  x   /  AST  133<H>  /  ALT  167<H>  /  AlkPhos  55  09-19    PT/INR - ( 19 Sep 2017 06:00 )   PT: 18.7 SEC;   INR: 1.65          PTT - ( 19 Sep 2017 06:00 )  PTT:29.6 SEC            RADIOLOGY & ADDITIONAL TESTS:    Imaging Personally Reviewed:    Consultant(s) Notes Reviewed:      Care Discussed with Consultants/Other Providers: ACCEPT NOTE    Patient is a 22y old  Female who presents with a chief complaint of Tylenol Ingestion (18 Sep 2017 19:45)      SUBJECTIVE / OVERNIGHT EVENTS:    HPI:   23 YO F with history of boderline [place], bipolar, seizure disorder currently treated with clonazepam presents with report of tylenol ingestion. She reports that at approximately 20:30 yesterday evening (20 hours prior to presentation) she finished a bottle of tylenol that contained approximately 100 pills. She has had several life stressors recently but had been feeling well until yesterday when a close friend was arrested which she reports precipitated her suicide attempt. Following her ingestion she was initially asymptomatic but began experiencing RUQ pain and N/V this morning. The emesis was initially nonbloody-nonbilious but the most recent episode has some faint red streaks. She also reports non-bloody diarrhea and throat discomfort following this ingestion. At the time of interview she denied active or passive SI and hallucinations. She denies any other toxic ingestion. Of note, patient reports previously being on keppra for seizures but stopped taking it due to adverse side effects (headache, nausea).     In the ED vitals were: 36.7 89 115/81 19 100%, a tylenol level was drawn at 17:00 with a level of 38, remainder of toxicology work up was negative. She was seen by toxicology who initiated NAC.       Interval history  No acute events overnight. Endorsing RUQ and epigastric abdominal pain. No further nausea or vomiting episodes. Reports some regret over incident but otherwise without suicidal ideation.       MEDICATIONS  (STANDING):  sodium chloride 0.9%. 1000 milliLiter(s) (100 mL/Hr) IV Continuous <Continuous>  pantoprazole    Tablet 40 milliGRAM(s) Oral daily  clonazePAM Tablet 1 milliGRAM(s) Oral two times a day  influenza   Vaccine 0.5 milliLiter(s) IntraMuscular once    MEDICATIONS  (PRN):  ondansetron    Tablet 4 milliGRAM(s) Oral every 8 hours PRN Nausea and/or Vomiting  aluminum hydroxide/magnesium hydroxide/simethicone Suspension 30 milliLiter(s) Oral every 6 hours PRN Dyspepsia        CAPILLARY BLOOD GLUCOSE        I&O's Summary    Vital Signs Last 24 Hrs  T(C): 36.8 (19 Sep 2017 05:03), Max: 36.8 (19 Sep 2017 05:03)  T(F): 98.3 (19 Sep 2017 05:03), Max: 98.3 (19 Sep 2017 05:03)  HR: 62 (19 Sep 2017 05:03) (59 - 89)  BP: 101/59 (19 Sep 2017 05:03) (98/56 - 115/81)  BP(mean): --  RR: 16 (19 Sep 2017 05:03) (16 - 19)  SpO2: 100% (19 Sep 2017 05:03) (97% - 100%)      PHYSICAL EXAM:  GENERAL: NAD, well-developed, resting comfortably  HEAD:  Atraumatic, Normocephalic  EYES: EOMI, PERRLA, no icterus  NECK: Supple, No JVD  CHEST/LUNG: Clear to auscultation bilaterally; No wheeze  HEART: Regular rate and rhythm; No murmurs, rubs, or gallops  ABDOMEN: Soft, Mild tenderness in epigastric and RUQ pain. Nondistended; Bowel sounds present  EXTREMITIES:  2+ Peripheral Pulses, No clubbing, cyanosis, or edema  PSYCH: AAOx3  NEUROLOGY: non-focal  SKIN: No rashes or lesions    LABS:                        13.5   8.49  )-----------( 216      ( 19 Sep 2017 06:00 )             40.0     09-19    140  |  105  |  8   ----------------------------<  110<H>  3.2<L>   |  20<L>  |  0.70    Ca    8.2<L>      19 Sep 2017 06:00  Phos  1.9     09-19  Mg     2.1     09-19    TPro  6.3  /  Alb  3.5  /  TBili  0.5  /  DBili  x   /  AST  133<H>  /  ALT  167<H>  /  AlkPhos  55  09-19    PT/INR - ( 19 Sep 2017 06:00 )   PT: 18.7 SEC;   INR: 1.65          PTT - ( 19 Sep 2017 06:00 )  PTT:29.6 SEC            RADIOLOGY & ADDITIONAL TESTS:    Imaging Personally Reviewed:    Consultant(s) Notes Reviewed:      Care Discussed with Consultants/Other Providers:

## 2017-09-19 NOTE — BEHAVIORAL HEALTH ASSESSMENT NOTE - SUMMARY
22 year-old woman with a PPHx of borderline personality disorder and six prior psychiatric admissions and a PMHx of seizure disorder presenting after an intentional Tylenol overdose and now on the NAC protocol. Patient presently denies SI; however, given the severity of the patient's suicide attempt, she will require an inpatient psychiatric hospitalization once medically cleared.

## 2017-09-19 NOTE — PROGRESS NOTE ADULT - PROBLEM SELECTOR PLAN 4
Patient reports history of borderline PD  - Psych consult Patient reports history of borderline PD  - c/w 1:1  - Psych consult

## 2017-09-19 NOTE — BEHAVIORAL HEALTH ASSESSMENT NOTE - CASE SUMMARY
Pt seen/evaluated, chart reviewed. In brief, pt is a 22 year-old WF with a PPHx of borderline personality disorder and six prior psychiatric admissions (last in 10/16) and a PMHx of seizure disorder 2/2 benzo withdrawal, presenting after an intentional Tylenol overdose of around 60-70 tabs and now on the NAC protocol. On exam pt is calm and cooperative, presently denies SI; however, given the severity of the patient's suicide attempt, she will require an inpatient psychiatric hospitalization once medically cleared. Impression: borderline personality d/o, benzo abuse.  Plan: as above- cont CO 1:1 for danger to self, continue standing klonopin, transfer to [facility] once medically stable for further stabilization and treatment. Spoke with mother Corrine via phone today ([phone]) who is in agreement with this plan. Will follow closely.
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
## 2017-09-19 NOTE — BEHAVIORAL HEALTH ASSESSMENT NOTE - NSBHCHARTREVIEWVS_PSY_A_CORE FT
ICU Vital Signs Last 24 Hrs  T(C): 36.8 (19 Sep 2017 05:03), Max: 36.8 (19 Sep 2017 05:03)  T(F): 98.3 (19 Sep 2017 05:03), Max: 98.3 (19 Sep 2017 05:03)  HR: 62 (19 Sep 2017 05:03) (59 - 89)  BP: 101/59 (19 Sep 2017 05:03) (98/56 - 115/81)  BP(mean): --  ABP: --  ABP(mean): --  RR: 16 (19 Sep 2017 05:03) (16 - 19)  SpO2: 100% (19 Sep 2017 05:03) (97% - 100%)

## 2017-09-19 NOTE — PROGRESS NOTE ADULT - PROBLEM SELECTOR PLAN 1
Patient elevated acetaminophen levels per nomogram in ED.   - Received weight based NAC in ED with dose scheduled to end approximately 2 pm 9/19.  - Will recheck LFTs, APAP level at 1 pm 9/19 (hour 20 of tx)  - 1:1 nursing  - Psych consult Patient elevated acetaminophen levels per nomogram in ED, currently on NAC protocol  - labs suggestive of liver toxicity, ALT 16 up from 60,  up from 54, and elevated INR 1.65  - NAC treatment to be completed at 2 pm 9/19.  - Will recheck LFTs, APAP level at 1 pm 9/19 (hour 20 of tx)  - will f/u results with tox team  - 1:1 nursing  - Psych consult

## 2017-09-19 NOTE — BEHAVIORAL HEALTH ASSESSMENT NOTE - NSBHCONSULTRECOMMENDOTHER_PSY_A_CORE FT
- Pt will require transfer to [facility] once medically cleared for further stabilization and treatment
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
## 2017-09-19 NOTE — BEHAVIORAL HEALTH ASSESSMENT NOTE - NSBHCHARTREVIEWLAB_PSY_A_CORE FT
CBC Full  -  ( 19 Sep 2017 06:00 )  WBC Count : 8.49 K/uL  Hemoglobin : 13.5 g/dL  Hematocrit : 40.0 %  Platelet Count - Automated : 216 K/uL  Mean Cell Volume : 83.0 fL  Mean Cell Hemoglobin : 28.0 pg  Mean Cell Hemoglobin Concentration : 33.8 %  Auto Neutrophil # : 6.93 K/uL  Auto Lymphocyte # : 0.96 K/uL  Auto Monocyte # : 0.35 K/uL  Auto Eosinophil # : 0.11 K/uL  Auto Basophil # : 0.10 K/uL  Auto Neutrophil % : 81.6 %  Auto Lymphocyte % : 11.3 %  Auto Monocyte % : 4.1 %  Auto Eosinophil % : 1.3 %  Auto Basophil % : 1.2 %  09-19    140  |  105  |  8   ----------------------------<  110<H>  3.2<L>   |  20<L>  |  0.70    Ca    8.2<L>      19 Sep 2017 06:00  Phos  1.9     09-19  Mg     2.1     09-19    TPro  6.5  /  Alb  3.6  /  TBili  0.4  /  DBili  0.1  /  AST  818<H>  /  ALT  822<H>  /  AlkPhos  55  09-19

## 2017-09-19 NOTE — PROGRESS NOTE ADULT - PROBLEM SELECTOR PLAN 3
Patient reports history of BPAD on lithium that was self discontinued 1.5 mo ago  - Psych consult Patient reports history of BPAD on lithium that was self discontinued 1.5 mo ago c/w clonazepam  - Psych consult

## 2017-09-19 NOTE — PROGRESS NOTE ADULT - PROBLEM SELECTOR PLAN 5
Defer DVT prophylaxis as patient is otherwise healthy and active Pt with reported history of seizure disorder, previously on keppra self-discontinued  - will monitor for now

## 2017-09-19 NOTE — BEHAVIORAL HEALTH ASSESSMENT NOTE - RISK ASSESSMENT
Elevated risk for suicide given lethality of most recent SA and a history of two prior suicide attempts, her diagnosis of borderline personality disorder, her history of substance misuse, and pending legal issues. The patient's protective factors include her employment, being future-oriented, denying SI presently, strong relationship with her girlfriend.

## 2017-09-19 NOTE — PROGRESS NOTE ADULT - ASSESSMENT
23 YO F with history of borderline personality disorder and bipolar disorder presents after intentional tylenol overdose, s/p NAC treatment. 21 YO F with history of borderline personality disorder and bipolar disorder presents after intentional Tylenol overdose, currently on NAC protocol.

## 2017-09-20 DIAGNOSIS — H02.401 UNSPECIFIED PTOSIS OF RIGHT EYELID: ICD-10-CM

## 2017-09-20 LAB
ALBUMIN SERPL ELPH-MCNC: 3.7 G/DL — SIGNIFICANT CHANGE UP (ref 3.3–5)
ALP SERPL-CCNC: 57 U/L — SIGNIFICANT CHANGE UP (ref 40–120)
ALT FLD-CCNC: 2726 U/L — HIGH (ref 4–33)
AST SERPL-CCNC: 1761 U/L — HIGH (ref 4–32)
BASE EXCESS BLDV CALC-SCNC: 0.5 MMOL/L — SIGNIFICANT CHANGE UP
BILIRUB SERPL-MCNC: 0.7 MG/DL — SIGNIFICANT CHANGE UP (ref 0.2–1.2)
BUN SERPL-MCNC: 6 MG/DL — LOW (ref 7–23)
CALCIUM SERPL-MCNC: 8.3 MG/DL — LOW (ref 8.4–10.5)
CHLORIDE SERPL-SCNC: 105 MMOL/L — SIGNIFICANT CHANGE UP (ref 98–107)
CO2 SERPL-SCNC: 22 MMOL/L — SIGNIFICANT CHANGE UP (ref 22–31)
CREAT SERPL-MCNC: 0.74 MG/DL — SIGNIFICANT CHANGE UP (ref 0.5–1.3)
GAS PNL BLDV: 139 MMOL/L — SIGNIFICANT CHANGE UP (ref 136–146)
GLUCOSE BLDV-MCNC: 80 — SIGNIFICANT CHANGE UP (ref 70–99)
GLUCOSE SERPL-MCNC: 92 MG/DL — SIGNIFICANT CHANGE UP (ref 70–99)
HCO3 BLDV-SCNC: 25 MMOL/L — SIGNIFICANT CHANGE UP (ref 20–27)
HCT VFR BLD CALC: 39.8 % — SIGNIFICANT CHANGE UP (ref 34.5–45)
HCT VFR BLDV CALC: 42 % — SIGNIFICANT CHANGE UP (ref 34.5–45)
HGB BLD-MCNC: 13.8 G/DL — SIGNIFICANT CHANGE UP (ref 11.5–15.5)
HGB BLDV-MCNC: 13.7 G/DL — SIGNIFICANT CHANGE UP (ref 11.5–15.5)
INR BLD: 1.75 — HIGH (ref 0.88–1.17)
LACTATE SERPL-SCNC: 1 MMOL/L — SIGNIFICANT CHANGE UP (ref 0.5–2)
MAGNESIUM SERPL-MCNC: 2 MG/DL — SIGNIFICANT CHANGE UP (ref 1.6–2.6)
MCHC RBC-ENTMCNC: 28.7 PG — SIGNIFICANT CHANGE UP (ref 27–34)
MCHC RBC-ENTMCNC: 34.7 % — SIGNIFICANT CHANGE UP (ref 32–36)
MCV RBC AUTO: 82.7 FL — SIGNIFICANT CHANGE UP (ref 80–100)
NRBC # FLD: 0 — SIGNIFICANT CHANGE UP
PCO2 BLDV: 42 MMHG — SIGNIFICANT CHANGE UP (ref 41–51)
PH BLDV: 7.39 PH — SIGNIFICANT CHANGE UP (ref 7.32–7.43)
PHOSPHATE SERPL-MCNC: 2.1 MG/DL — LOW (ref 2.5–4.5)
PLATELET # BLD AUTO: 184 K/UL — SIGNIFICANT CHANGE UP (ref 150–400)
PMV BLD: 10.8 FL — SIGNIFICANT CHANGE UP (ref 7–13)
PO2 BLDV: 100 MMHG — HIGH (ref 35–40)
POTASSIUM BLDV-SCNC: 3.3 MMOL/L — LOW (ref 3.4–4.5)
POTASSIUM SERPL-MCNC: 3.5 MMOL/L — SIGNIFICANT CHANGE UP (ref 3.5–5.3)
POTASSIUM SERPL-SCNC: 3.5 MMOL/L — SIGNIFICANT CHANGE UP (ref 3.5–5.3)
PROT SERPL-MCNC: 6.6 G/DL — SIGNIFICANT CHANGE UP (ref 6–8.3)
PROTHROM AB SERPL-ACNC: 19.8 SEC — HIGH (ref 9.8–13.1)
RBC # BLD: 4.81 M/UL — SIGNIFICANT CHANGE UP (ref 3.8–5.2)
RBC # FLD: 12.6 % — SIGNIFICANT CHANGE UP (ref 10.3–14.5)
SAO2 % BLDV: 98.1 % — HIGH (ref 60–85)
SODIUM SERPL-SCNC: 141 MMOL/L — SIGNIFICANT CHANGE UP (ref 135–145)
WBC # BLD: 7.96 K/UL — SIGNIFICANT CHANGE UP (ref 3.8–10.5)
WBC # FLD AUTO: 7.96 K/UL — SIGNIFICANT CHANGE UP (ref 3.8–10.5)

## 2017-09-20 PROCEDURE — 99233 SBSQ HOSP IP/OBS HIGH 50: CPT | Mod: GC

## 2017-09-20 PROCEDURE — 99233 SBSQ HOSP IP/OBS HIGH 50: CPT

## 2017-09-20 RX ORDER — SODIUM CHLORIDE 0.65 %
1 AEROSOL, SPRAY (ML) NASAL
Qty: 0 | Refills: 0 | Status: DISCONTINUED | OUTPATIENT
Start: 2017-09-20 | End: 2017-09-22

## 2017-09-20 RX ORDER — ACETYLCYSTEINE 200 MG/ML
8 VIAL (ML) MISCELLANEOUS ONCE
Qty: 0 | Refills: 0 | Status: COMPLETED | OUTPATIENT
Start: 2017-09-20 | End: 2017-09-20

## 2017-09-20 RX ADMIN — Medication 1 PATCH: at 12:23

## 2017-09-20 RX ADMIN — Medication 1 PATCH: at 11:44

## 2017-09-20 RX ADMIN — Medication 5 MILLIGRAM(S): at 05:42

## 2017-09-20 RX ADMIN — Medication 1 TABLET(S): at 09:27

## 2017-09-20 RX ADMIN — PANTOPRAZOLE SODIUM 40 MILLIGRAM(S): 20 TABLET, DELAYED RELEASE ORAL at 11:44

## 2017-09-20 RX ADMIN — Medication 1 MILLIGRAM(S): at 18:11

## 2017-09-20 RX ADMIN — SODIUM CHLORIDE 125 MILLILITER(S): 9 INJECTION INTRAMUSCULAR; INTRAVENOUS; SUBCUTANEOUS at 10:39

## 2017-09-20 RX ADMIN — Medication 30 MILLILITER(S): at 05:48

## 2017-09-20 RX ADMIN — SODIUM CHLORIDE 125 MILLILITER(S): 9 INJECTION INTRAMUSCULAR; INTRAVENOUS; SUBCUTANEOUS at 19:50

## 2017-09-20 RX ADMIN — Medication 65 GRAM(S): at 10:38

## 2017-09-20 RX ADMIN — Medication 30 MILLILITER(S): at 19:51

## 2017-09-20 RX ADMIN — SODIUM CHLORIDE 125 MILLILITER(S): 9 INJECTION INTRAMUSCULAR; INTRAVENOUS; SUBCUTANEOUS at 02:19

## 2017-09-20 RX ADMIN — Medication 1 MILLIGRAM(S): at 05:43

## 2017-09-20 NOTE — PROGRESS NOTE ADULT - ASSESSMENT
21 YO F with history of borderline personality disorder and bipolar disorder on clonazepam presents after intentional Tylenol overdose, currently on NAC protocol, 23 YO F with history of borderline personality disorder and bipolar disorder on clonazepam presents with nausea, vomiting, abdominal pain, found to have drug-induced hepatitis, currently on NAC protocol and pending disposition to inpatient psych once medically stable

## 2017-09-20 NOTE — PROGRESS NOTE BEHAVIORAL HEALTH - NSBHFUPINTERVALHXFT_PSY_A_CORE
Patient seen and chart reviewed. No events overnight. States today that her mood is "better." Denies SI. Says she regrets her suicide attempt. Says she still has some abdominal pain but it able to eat better today than yesterday. Regarding plans for when she's medically cleared, states "I'm willing to do what I'm told to do." Mother by bedside agrees with the need for psychiatric inpatient treatment once medically cleared. Patient may be interested in substance abuse rehab after psychiatric inpatient treatment. Denies symptoms of psychosis.

## 2017-09-20 NOTE — PROGRESS NOTE ADULT - PROBLEM SELECTOR PLAN 3
Patient reports history of BPAD on lithium that was self discontinued 1.5 mo ago c/w clonazepam  - No need to restart lithium per psych  - c/w clonazepam

## 2017-09-20 NOTE — PROGRESS NOTE ADULT - PROBLEM SELECTOR PLAN 1
Patient elevated acetaminophen levels per nomogram in ED, on continued NAC therapy despite low acetaminophen levels due to liver toxicity  - LFTS uptrended to 800s and uptrending INR at 1.74  - AM labs with  - will f/u results with tox team  - psycho on board; disposition to inpatient psych once medically stable Patient elevated acetaminophen levels per nomogram in ED, on continued NAC therapy despite low acetaminophen levels due to liver toxicity  - LFTS uptrended to 800s and uptrending INR at 1.74  - AM labs with  - will f/u results with tox team  - psych on board; disposition to inpatient psych once medically stable Patient elevated acetaminophen levels per nomogram in ED, on continued NAC therapy despite low acetaminophen levels due to liver toxicity  - LFTS uptrended to 800s and uptrending INR at 1.74  - AM labs with continued uptrend  - will f/u results with tox team  - psych on board; disposition to inpatient psych once medically stable

## 2017-09-20 NOTE — PROGRESS NOTE ADULT - PROBLEM SELECTOR PLAN 4
Noted by patient family yesterday. Associated with mild right sided paresthesia, but otherwise normal neurological exam. Low suspicion for intracranial bleed or ischemic event. Per toxicology team, not usual side effect of tylenol toxicity.   - CT head Noted by patient family yesterday. Associated with mild photosensitivity and right sided paresthesia, but otherwise normal neurological exam. Low suspicion for intracranial bleed or ischemic event. Per toxicology team, not usual side effect of tylenol toxicity.   - CT head Noted by patient family yesterday. Associated with mild photosensitivity and right sided paresthesia, but otherwise normal neurological exam. Low suspicion for intracranial bleed or ischemic event. Per toxicology team, not usual side effect of tylenol toxicity.   - CT head Nl.  resolved.  pt reports dry nasal passages.  saline nasal spray

## 2017-09-20 NOTE — PROGRESS NOTE BEHAVIORAL HEALTH - SUMMARY
22 year-old woman with borderline personality disorder presenting after a serious suicide attempt overdosing on Tylenol in the setting of legal and substance abuse struggles. Mood today is improving and affect is euthymic. Mother by bedside and patient agree with plans for psychiatric inpatient hospitalization once medically cleared.

## 2017-09-20 NOTE — PROGRESS NOTE BEHAVIORAL HEALTH - CASE SUMMARY
Pt seen in follow-up; yesterday had extensive discussion via phone with mother re: dispo plan, she agreed for inpatient psych admission after medical clearance. On exam today, pt is reluctant but agreeable as well to transfer to inpatient psych at [facility] for further stabilization and treatment, once medically stable. Reports mood is getting better, denies any SI at this time. No psychotic sx, and she is A&Ox3. Will follow closely.

## 2017-09-20 NOTE — PROGRESS NOTE ADULT - PROBLEM SELECTOR PLAN 6
Pt with reported history of seizure disorder, after abruptly discontinuing benzodiazepines. Was previously on Keppra, which patient self-discontinued  - will monitor for now

## 2017-09-20 NOTE — PROGRESS NOTE ADULT - SUBJECTIVE AND OBJECTIVE BOX
Patient is a 22y old  Female who presents with a chief complaint of Tylenol Ingestion (18 Sep 2017 19:45)      SUBJECTIVE / OVERNIGHT EVENTS:  No acute events overnight. Pt went for CT head. With some complaint of epigastric pain and nausea relieved by prn meds.     MEDICATIONS  (STANDING):  pantoprazole    Tablet 40 milliGRAM(s) Oral daily  clonazePAM Tablet 1 milliGRAM(s) Oral two times a day  influenza   Vaccine 0.5 milliLiter(s) IntraMuscular once  potassium acid phosphate/sodium acid phosphate tablet (K-PHOS No. 2) 1 Tablet(s) Oral four times a day with meals  nicotine -   7 mG/24Hr(s) Patch 1 patch Transdermal daily  sodium chloride 0.9%. 1000 milliLiter(s) (125 mL/Hr) IV Continuous <Continuous>    MEDICATIONS  (PRN):  ondansetron    Tablet 4 milliGRAM(s) Oral every 8 hours PRN Nausea and/or Vomiting  aluminum hydroxide/magnesium hydroxide/simethicone Suspension 30 milliLiter(s) Oral every 6 hours PRN Dyspepsia  ondansetron Injectable 4 milliGRAM(s) IV Push every 6 hours PRN Nausea and/or Vomiting  metoclopramide Injectable 5 milliGRAM(s) IV Push every 8 hours PRN nausea and/or vomiting      Vital Signs Last 24 Hrs  T(C): 37.3 (19 Sep 2017 22:00), Max: 38.3 (19 Sep 2017 21:19)  T(F): 99.2 (19 Sep 2017 22:00), Max: 101 (19 Sep 2017 21:19)  HR: 80 (19 Sep 2017 21:19) (80 - 80)  BP: 112/72 (19 Sep 2017 21:19) (112/72 - 112/72)  BP(mean): --  RR: 17 (19 Sep 2017 21:19) (17 - 17)  SpO2: 99% (19 Sep 2017 21:19) (99% - 99%)  CAPILLARY BLOOD GLUCOSE        I&O's Summary    19 Sep 2017 07:01  -  20 Sep 2017 05:27  --------------------------------------------------------  IN: 750 mL / OUT: 0 mL / NET: 750 mL        PHYSICAL EXAM:  GENERAL: NAD, well-developed  HEAD:  Atraumatic, Normocephalic  EYES: EOMI, PERRLA, conjunctiva and sclera clear  NECK: Supple, No JVD  CHEST/LUNG: Clear to auscultation bilaterally; No wheeze  HEART: Regular rate and rhythm; No murmurs, rubs, or gallops  ABDOMEN: Soft,, Nondistended; Bowel sounds present. Mild epigastric tenderness  EXTREMITIES:  2+ Peripheral Pulses, No clubbing, cyanosis, or edema  PSYCH: AAOx3  NEUROLOGY: non-focal  SKIN: No rashes or lesions    LABS:                            13.5   8.49  )-----------( 216      ( 19 Sep 2017 06:00 )             40.0     09-19    140  |  105  |  8   ----------------------------<  110<H>  3.2<L>   |  20<L>  |  0.70    Ca    8.2<L>      19 Sep 2017 06:00  Phos  1.9     09-19  Mg     2.1     09-19    TPro  6.5  /  Alb  3.6  /  TBili  0.4  /  DBili  0.1  /  AST  818<H>  /  ALT  822<H>  /  AlkPhos  55  09-19    PT/INR - ( 19 Sep 2017 12:45 )   PT: 19.7 SEC;   INR: 1.74          PTT - ( 19 Sep 2017 06:00 )  PTT:29.6 SEC  CARDIAC MARKERS ( 18 Sep 2017 17:03 )  x     / x     / 219 u/L / x     / x              RADIOLOGY & ADDITIONAL TESTS:    Imaging Personally Reviewed:  CT head    Consultant(s) Notes Reviewed:      Care Discussed with Consultants/Other Providers: Patient is a 22y old  Female who presents with a chief complaint of Tylenol Ingestion (18 Sep 2017 19:45)      SUBJECTIVE / OVERNIGHT EVENTS:  No acute events overnight. Pt went for CT head. With some complaint of epigastric pain and nausea relieved by prn meds.     MEDICATIONS  (STANDING):  pantoprazole    Tablet 40 milliGRAM(s) Oral daily  clonazePAM Tablet 1 milliGRAM(s) Oral two times a day  influenza   Vaccine 0.5 milliLiter(s) IntraMuscular once  potassium acid phosphate/sodium acid phosphate tablet (K-PHOS No. 2) 1 Tablet(s) Oral four times a day with meals  nicotine -   7 mG/24Hr(s) Patch 1 patch Transdermal daily  sodium chloride 0.9%. 1000 milliLiter(s) (125 mL/Hr) IV Continuous <Continuous>    MEDICATIONS  (PRN):  ondansetron    Tablet 4 milliGRAM(s) Oral every 8 hours PRN Nausea and/or Vomiting  aluminum hydroxide/magnesium hydroxide/simethicone Suspension 30 milliLiter(s) Oral every 6 hours PRN Dyspepsia  ondansetron Injectable 4 milliGRAM(s) IV Push every 6 hours PRN Nausea and/or Vomiting  metoclopramide Injectable 5 milliGRAM(s) IV Push every 8 hours PRN nausea and/or vomiting      Vital Signs Last 24 Hrs  T(C): 37.3 (19 Sep 2017 22:00), Max: 38.3 (19 Sep 2017 21:19)  T(F): 99.2 (19 Sep 2017 22:00), Max: 101 (19 Sep 2017 21:19)  HR: 80 (19 Sep 2017 21:19) (80 - 80)  BP: 112/72 (19 Sep 2017 21:19) (112/72 - 112/72)  BP(mean): --  RR: 17 (19 Sep 2017 21:19) (17 - 17)  SpO2: 99% (19 Sep 2017 21:19) (99% - 99%)  CAPILLARY BLOOD GLUCOSE        I&O's Summary    19 Sep 2017 07:01  -  20 Sep 2017 05:27  --------------------------------------------------------  IN: 750 mL / OUT: 0 mL / NET: 750 mL        PHYSICAL EXAM:  GENERAL: NAD, well-developed  HEAD:  Atraumatic, Normocephalic  EYES: EOMI, PERRLA, conjunctiva and sclera clear  NECK: Supple, No JVD  CHEST/LUNG: Clear to auscultation bilaterally; No wheeze  HEART: Regular rate and rhythm; No murmurs, rubs, or gallops  ABDOMEN: Soft,, Nondistended; Bowel sounds present. Mild epigastric tenderness  EXTREMITIES:  2+ Peripheral Pulses, No clubbing, cyanosis, or edema  PSYCH: AAOx3  NEUROLOGY:   SKIN: No rashes or lesions    LABS:                            13.5   8.49  )-----------( 216      ( 19 Sep 2017 06:00 )             40.0     09-19    140  |  105  |  8   ----------------------------<  110<H>  3.2<L>   |  20<L>  |  0.70    Ca    8.2<L>      19 Sep 2017 06:00  Phos  1.9     09-19  Mg     2.1     09-19    TPro  6.5  /  Alb  3.6  /  TBili  0.4  /  DBili  0.1  /  AST  818<H>  /  ALT  822<H>  /  AlkPhos  55  09-19    PT/INR - ( 19 Sep 2017 12:45 )   PT: 19.7 SEC;   INR: 1.74          PTT - ( 19 Sep 2017 06:00 )  PTT:29.6 SEC  CARDIAC MARKERS ( 18 Sep 2017 17:03 )  x     / x     / 219 u/L / x     / x              RADIOLOGY & ADDITIONAL TESTS:    Imaging Personally Reviewed:  CT head    Consultant(s) Notes Reviewed:      Care Discussed with Consultants/Other Providers: Patient is a 22y old  Female who presents with a chief complaint of Tylenol Ingestion (18 Sep 2017 19:45)      SUBJECTIVE / OVERNIGHT EVENTS:  No acute events overnight. Febrile to 100.3 that since resolved. Pt went for CT head- preliminary read unremarkable. With some complaint of epigastric pain and nausea relieved by prn meds.     MEDICATIONS  (STANDING):  pantoprazole    Tablet 40 milliGRAM(s) Oral daily  clonazePAM Tablet 1 milliGRAM(s) Oral two times a day  influenza   Vaccine 0.5 milliLiter(s) IntraMuscular once  potassium acid phosphate/sodium acid phosphate tablet (K-PHOS No. 2) 1 Tablet(s) Oral four times a day with meals  nicotine -   7 mG/24Hr(s) Patch 1 patch Transdermal daily  sodium chloride 0.9%. 1000 milliLiter(s) (125 mL/Hr) IV Continuous <Continuous>    MEDICATIONS  (PRN):  ondansetron    Tablet 4 milliGRAM(s) Oral every 8 hours PRN Nausea and/or Vomiting  aluminum hydroxide/magnesium hydroxide/simethicone Suspension 30 milliLiter(s) Oral every 6 hours PRN Dyspepsia  ondansetron Injectable 4 milliGRAM(s) IV Push every 6 hours PRN Nausea and/or Vomiting  metoclopramide Injectable 5 milliGRAM(s) IV Push every 8 hours PRN nausea and/or vomiting      Vital Signs Last 24 Hrs  T(C): 37.3 (19 Sep 2017 22:00), Max: 38.3 (19 Sep 2017 21:19)  T(F): 99.2 (19 Sep 2017 22:00), Max: 101 (19 Sep 2017 21:19)  HR: 80 (19 Sep 2017 21:19) (80 - 80)  BP: 112/72 (19 Sep 2017 21:19) (112/72 - 112/72)  BP(mean): --  RR: 17 (19 Sep 2017 21:19) (17 - 17)  SpO2: 99% (19 Sep 2017 21:19) (99% - 99%)  CAPILLARY BLOOD GLUCOSE        I&O's Summary    19 Sep 2017 07:01  -  20 Sep 2017 05:27  --------------------------------------------------------  IN: 750 mL / OUT: 0 mL / NET: 750 mL        PHYSICAL EXAM:  GENERAL: NAD, well-developed  HEAD:  Atraumatic, Normocephalic  EYES: EOMI, PERRLA, conjunctiva and sclera clear  NECK: Supple, No JVD  CHEST/LUNG: CTAB; No wheeze  HEART: Regular rate and rhythm; No murmurs, rubs, or gallops  ABDOMEN: Soft, Nondistended; Bowel sounds present. Mild epigastric tenderness  EXTREMITIES:  2+ Peripheral Pulses, No clubbing, cyanosis, or edema  PSYCH: AAOx3  NEUROLOGY: AAO x3  SKIN: No rashes or lesions    LABS:                            13.5   8.49  )-----------( 216      ( 19 Sep 2017 06:00 )             40.0     09-19    140  |  105  |  8   ----------------------------<  110<H>  3.2<L>   |  20<L>  |  0.70    Ca    8.2<L>      19 Sep 2017 06:00  Phos  1.9     09-19  Mg     2.1     09-19    TPro  6.5  /  Alb  3.6  /  TBili  0.4  /  DBili  0.1  /  AST  818<H>  /  ALT  822<H>  /  AlkPhos  55  09-19    PT/INR - ( 19 Sep 2017 12:45 )   PT: 19.7 SEC;   INR: 1.74          PTT - ( 19 Sep 2017 06:00 )  PTT:29.6 SEC  CARDIAC MARKERS ( 18 Sep 2017 17:03 )  x     / x     / 219 u/L / x     / x              RADIOLOGY & ADDITIONAL TESTS:    Imaging Personally Reviewed:  CT head- no acute pathology.     Consultant(s) Notes Reviewed:      Care Discussed with Consultants/Other Providers: Patient is a 22y old  Female who presents with a chief complaint of Tylenol Ingestion (18 Sep 2017 19:45)      SUBJECTIVE / OVERNIGHT EVENTS:  No acute events overnight. Febrile to 100.3 that since resolved. Pt went for CT head- preliminary read unremarkable. With some complaint of epigastric pain and nausea relieved by prn meds.     MEDICATIONS  (STANDING):  pantoprazole    Tablet 40 milliGRAM(s) Oral daily  clonazePAM Tablet 1 milliGRAM(s) Oral two times a day  influenza   Vaccine 0.5 milliLiter(s) IntraMuscular once  potassium acid phosphate/sodium acid phosphate tablet (K-PHOS No. 2) 1 Tablet(s) Oral four times a day with meals  nicotine -   7 mG/24Hr(s) Patch 1 patch Transdermal daily  sodium chloride 0.9%. 1000 milliLiter(s) (125 mL/Hr) IV Continuous <Continuous>    MEDICATIONS  (PRN):  ondansetron    Tablet 4 milliGRAM(s) Oral every 8 hours PRN Nausea and/or Vomiting  aluminum hydroxide/magnesium hydroxide/simethicone Suspension 30 milliLiter(s) Oral every 6 hours PRN Dyspepsia  ondansetron Injectable 4 milliGRAM(s) IV Push every 6 hours PRN Nausea and/or Vomiting  metoclopramide Injectable 5 milliGRAM(s) IV Push every 8 hours PRN nausea and/or vomiting      Vital Signs Last 24 Hrs  T(C): 37.3 (19 Sep 2017 22:00), Max: 38.3 (19 Sep 2017 21:19)  T(F): 99.2 (19 Sep 2017 22:00), Max: 101 (19 Sep 2017 21:19)  HR: 80 (19 Sep 2017 21:19) (80 - 80)  BP: 112/72 (19 Sep 2017 21:19) (112/72 - 112/72)  BP(mean): --  RR: 17 (19 Sep 2017 21:19) (17 - 17)  SpO2: 99% (19 Sep 2017 21:19) (99% - 99%)  CAPILLARY BLOOD GLUCOSE        I&O's Summary    19 Sep 2017 07:01  -  20 Sep 2017 05:27  --------------------------------------------------------  IN: 750 mL / OUT: 0 mL / NET: 750 mL        PHYSICAL EXAM:  GENERAL: NAD, well-developed  HEAD:  Atraumatic, Normocephalic  EYES: EOMI, PERRLA, conjunctiva and sclera clear  NECK: Supple, No JVD  CHEST/LUNG: CTAB; No wheeze  HEART: Regular rate and rhythm; No murmurs, rubs, or gallops  ABDOMEN: Soft, Nondistended; Bowel sounds present. Mild epigastric tenderness  EXTREMITIES:  2+ Peripheral Pulses, No clubbing, cyanosis, or edema  PSYCH: AAOx3  NEUROLOGY: AAO x3  SKIN: No rashes or lesions    LABS:                            13.5   8.49  )-----------( 216      ( 19 Sep 2017 06:00 )             40.0     09-19    140  |  105  |  8   ----------------------------<  110<H>  3.2<L>   |  20<L>  |  0.70    Ca    8.2<L>      19 Sep 2017 06:00  Phos  1.9     09-19  Mg     2.1     09-19    TPro  6.5  /  Alb  3.6  /  TBili  0.4  /  DBili  0.1  /  AST  818<H>  /  ALT  822<H>  /  AlkPhos  55  09-19    PT/INR - ( 19 Sep 2017 12:45 )   PT: 19.7 SEC;   INR: 1.74          PTT - ( 19 Sep 2017 06:00 )  PTT:29.6 SEC  CARDIAC MARKERS ( 18 Sep 2017 17:03 )  x     / x     / 219 u/L / x     / x        09-20    141  |  105  |  6<L>  ----------------------------<  92  3.5   |  22  |  0.74    Ca    8.3<L>      20 Sep 2017 06:10  Phos  2.1     09-20  Mg     2.0     09-20    TPro  6.6  /  Alb  3.7  /  TBili  0.7  /  DBili  x   /  AST  1761<H>  /  ALT  2726<H>  /  AlkPhos  57  09-20        RADIOLOGY & ADDITIONAL TESTS:    Imaging Personally Reviewed:  CT head- no acute pathology.     Consultant(s) Notes Reviewed:  Psych    Care Discussed with Consultants/Other Providers:

## 2017-09-21 DIAGNOSIS — H92.03 OTALGIA, BILATERAL: ICD-10-CM

## 2017-09-21 LAB
ALBUMIN SERPL ELPH-MCNC: 3.2 G/DL — LOW (ref 3.3–5)
ALBUMIN SERPL ELPH-MCNC: 3.6 G/DL — SIGNIFICANT CHANGE UP (ref 3.3–5)
ALP SERPL-CCNC: 46 U/L — SIGNIFICANT CHANGE UP (ref 40–120)
ALP SERPL-CCNC: 52 U/L — SIGNIFICANT CHANGE UP (ref 40–120)
ALT FLD-CCNC: 1645 U/L — HIGH (ref 4–33)
ALT FLD-CCNC: 1896 U/L — HIGH (ref 4–33)
AST SERPL-CCNC: 423 U/L — HIGH (ref 4–32)
AST SERPL-CCNC: 631 U/L — HIGH (ref 4–32)
BASE EXCESS BLDV CALC-SCNC: -0.9 MMOL/L — SIGNIFICANT CHANGE UP
BILIRUB SERPL-MCNC: 0.6 MG/DL — SIGNIFICANT CHANGE UP (ref 0.2–1.2)
BILIRUB SERPL-MCNC: 0.7 MG/DL — SIGNIFICANT CHANGE UP (ref 0.2–1.2)
BUN SERPL-MCNC: 5 MG/DL — LOW (ref 7–23)
BUN SERPL-MCNC: 5 MG/DL — LOW (ref 7–23)
CALCIUM SERPL-MCNC: 7.9 MG/DL — LOW (ref 8.4–10.5)
CALCIUM SERPL-MCNC: 8 MG/DL — LOW (ref 8.4–10.5)
CHLORIDE SERPL-SCNC: 107 MMOL/L — SIGNIFICANT CHANGE UP (ref 98–107)
CHLORIDE SERPL-SCNC: 109 MMOL/L — HIGH (ref 98–107)
CK SERPL-CCNC: 50 U/L — SIGNIFICANT CHANGE UP (ref 25–170)
CK SERPL-CCNC: 66 U/L — SIGNIFICANT CHANGE UP (ref 25–170)
CO2 SERPL-SCNC: 20 MMOL/L — LOW (ref 22–31)
CO2 SERPL-SCNC: 23 MMOL/L — SIGNIFICANT CHANGE UP (ref 22–31)
CREAT SERPL-MCNC: 0.55 MG/DL — SIGNIFICANT CHANGE UP (ref 0.5–1.3)
CREAT SERPL-MCNC: 0.6 MG/DL — SIGNIFICANT CHANGE UP (ref 0.5–1.3)
GAS PNL BLDV: 140 MMOL/L — SIGNIFICANT CHANGE UP (ref 136–146)
GLUCOSE BLDV-MCNC: 82 — SIGNIFICANT CHANGE UP (ref 70–99)
GLUCOSE SERPL-MCNC: 107 MG/DL — HIGH (ref 70–99)
GLUCOSE SERPL-MCNC: 83 MG/DL — SIGNIFICANT CHANGE UP (ref 70–99)
HCO3 BLDV-SCNC: 24 MMOL/L — SIGNIFICANT CHANGE UP (ref 20–27)
HCT VFR BLD CALC: 35.2 % — SIGNIFICANT CHANGE UP (ref 34.5–45)
HCT VFR BLDV CALC: 38.7 % — SIGNIFICANT CHANGE UP (ref 34.5–45)
HGB BLD-MCNC: 12.1 G/DL — SIGNIFICANT CHANGE UP (ref 11.5–15.5)
HGB BLDV-MCNC: 12.6 G/DL — SIGNIFICANT CHANGE UP (ref 11.5–15.5)
INR BLD: 1.4 — HIGH (ref 0.88–1.17)
INR BLD: 1.54 — HIGH (ref 0.88–1.17)
LACTATE SERPL-SCNC: 0.6 MMOL/L — SIGNIFICANT CHANGE UP (ref 0.5–2)
LACTATE SERPL-SCNC: 0.8 MMOL/L — SIGNIFICANT CHANGE UP (ref 0.5–2)
MAGNESIUM SERPL-MCNC: 1.9 MG/DL — SIGNIFICANT CHANGE UP (ref 1.6–2.6)
MAGNESIUM SERPL-MCNC: 2 MG/DL — SIGNIFICANT CHANGE UP (ref 1.6–2.6)
MCHC RBC-ENTMCNC: 28.2 PG — SIGNIFICANT CHANGE UP (ref 27–34)
MCHC RBC-ENTMCNC: 34.4 % — SIGNIFICANT CHANGE UP (ref 32–36)
MCV RBC AUTO: 82.1 FL — SIGNIFICANT CHANGE UP (ref 80–100)
NRBC # FLD: 0 — SIGNIFICANT CHANGE UP
PCO2 BLDV: 43 MMHG — SIGNIFICANT CHANGE UP (ref 41–51)
PH BLDV: 7.37 PH — SIGNIFICANT CHANGE UP (ref 7.32–7.43)
PHOSPHATE SERPL-MCNC: 1.8 MG/DL — LOW (ref 2.5–4.5)
PHOSPHATE SERPL-MCNC: 2.3 MG/DL — LOW (ref 2.5–4.5)
PLATELET # BLD AUTO: 167 K/UL — SIGNIFICANT CHANGE UP (ref 150–400)
PMV BLD: 10.9 FL — SIGNIFICANT CHANGE UP (ref 7–13)
PO2 BLDV: 114 MMHG — HIGH (ref 35–40)
POTASSIUM BLDV-SCNC: 3.3 MMOL/L — LOW (ref 3.4–4.5)
POTASSIUM SERPL-MCNC: 3.6 MMOL/L — SIGNIFICANT CHANGE UP (ref 3.5–5.3)
POTASSIUM SERPL-MCNC: 3.6 MMOL/L — SIGNIFICANT CHANGE UP (ref 3.5–5.3)
POTASSIUM SERPL-SCNC: 3.6 MMOL/L — SIGNIFICANT CHANGE UP (ref 3.5–5.3)
POTASSIUM SERPL-SCNC: 3.6 MMOL/L — SIGNIFICANT CHANGE UP (ref 3.5–5.3)
PROT SERPL-MCNC: 6.1 G/DL — SIGNIFICANT CHANGE UP (ref 6–8.3)
PROT SERPL-MCNC: 6.7 G/DL — SIGNIFICANT CHANGE UP (ref 6–8.3)
PROTHROM AB SERPL-ACNC: 15.8 SEC — HIGH (ref 9.8–13.1)
PROTHROM AB SERPL-ACNC: 17.4 SEC — HIGH (ref 9.8–13.1)
RBC # BLD: 4.29 M/UL — SIGNIFICANT CHANGE UP (ref 3.8–5.2)
RBC # FLD: 12.9 % — SIGNIFICANT CHANGE UP (ref 10.3–14.5)
SAO2 % BLDV: 98.5 % — HIGH (ref 60–85)
SODIUM SERPL-SCNC: 141 MMOL/L — SIGNIFICANT CHANGE UP (ref 135–145)
SODIUM SERPL-SCNC: 143 MMOL/L — SIGNIFICANT CHANGE UP (ref 135–145)
WBC # BLD: 6.56 K/UL — SIGNIFICANT CHANGE UP (ref 3.8–10.5)
WBC # FLD AUTO: 6.56 K/UL — SIGNIFICANT CHANGE UP (ref 3.8–10.5)

## 2017-09-21 PROCEDURE — 99233 SBSQ HOSP IP/OBS HIGH 50: CPT | Mod: GC

## 2017-09-21 PROCEDURE — 99232 SBSQ HOSP IP/OBS MODERATE 35: CPT

## 2017-09-21 RX ORDER — SODIUM,POTASSIUM PHOSPHATES 278-250MG
1 POWDER IN PACKET (EA) ORAL
Qty: 0 | Refills: 0 | Status: COMPLETED | OUTPATIENT
Start: 2017-09-21 | End: 2017-09-22

## 2017-09-21 RX ORDER — POTASSIUM PHOSPHATE, MONOBASIC POTASSIUM PHOSPHATE, DIBASIC 236; 224 MG/ML; MG/ML
15 INJECTION, SOLUTION INTRAVENOUS ONCE
Qty: 0 | Refills: 0 | Status: COMPLETED | OUTPATIENT
Start: 2017-09-21 | End: 2017-09-21

## 2017-09-21 RX ADMIN — Medication 1 SPRAY(S): at 19:58

## 2017-09-21 RX ADMIN — Medication 1 TABLET(S): at 17:40

## 2017-09-21 RX ADMIN — Medication 1 TABLET(S): at 21:50

## 2017-09-21 RX ADMIN — Medication 1 PATCH: at 11:59

## 2017-09-21 RX ADMIN — Medication 30 MILLILITER(S): at 23:49

## 2017-09-21 RX ADMIN — Medication 1 MILLIGRAM(S): at 05:31

## 2017-09-21 RX ADMIN — POTASSIUM PHOSPHATE, MONOBASIC POTASSIUM PHOSPHATE, DIBASIC 62.5 MILLIMOLE(S): 236; 224 INJECTION, SOLUTION INTRAVENOUS at 09:55

## 2017-09-21 RX ADMIN — PANTOPRAZOLE SODIUM 40 MILLIGRAM(S): 20 TABLET, DELAYED RELEASE ORAL at 12:00

## 2017-09-21 RX ADMIN — Medication 1 PATCH: at 12:02

## 2017-09-21 RX ADMIN — Medication 1 TABLET(S): at 12:14

## 2017-09-21 RX ADMIN — SODIUM CHLORIDE 125 MILLILITER(S): 9 INJECTION INTRAMUSCULAR; INTRAVENOUS; SUBCUTANEOUS at 05:32

## 2017-09-21 RX ADMIN — Medication 1 SPRAY(S): at 03:49

## 2017-09-21 RX ADMIN — Medication 1 MILLIGRAM(S): at 17:41

## 2017-09-21 NOTE — PROGRESS NOTE ADULT - PROBLEM SELECTOR PLAN 7
Defer DVT prophylaxis as patient is otherwise healthy and active Patient reports history of borderline PD  - c/w 1:1  - Psych consult

## 2017-09-21 NOTE — PROGRESS NOTE ADULT - PROBLEM SELECTOR PLAN 4
Resolved. Unlikely sequelae of APEP overdose.   - CT head wnl  resolved.    - Pt reports dry nasal passages.  saline nasal spray Likely due to cerumen buildup, given pt has had sx relieved by ear cleaning. No s/s of sinusitis or otitis.   - reports mother will bring in home regimen supplies today

## 2017-09-21 NOTE — PROGRESS NOTE ADULT - PROBLEM SELECTOR PLAN 5
Patient reports history of borderline PD  - c/w 1:1  - Psych consult Resolving. Minimal ptosis of the right eye.  - Ct head negative  -continue to monitor

## 2017-09-21 NOTE — PROGRESS NOTE BEHAVIORAL HEALTH - CASE SUMMARY
22 year-old woman with borderline personality disorder presenting after a serious suicide attempt overdosing on Tylenol in the setting of legal and substance abuse struggles. Mood today is improving and affect is euthymic. Patient agrees with inpatient treatment and completes voluntary paperwork. Plan to transfer to [place] once medically cleared.

## 2017-09-21 NOTE — PROGRESS NOTE BEHAVIORAL HEALTH - NSBHFUPINTERVALHXFT_PSY_A_CORE
Patient seen and chart reviewed. No events overnight. States today that she continues to feel better in terms of her mood. Denies depressed mood or SI. Expresses understanding of her need for psychiatric inpatient treatment once medically cleared. Agrees to voluntary admission and completes voluntary paperwork. Denies symptoms of psychosis. States she is getting along well with family and her girlfriend.

## 2017-09-21 NOTE — PROGRESS NOTE ADULT - PROBLEM SELECTOR PLAN 6
Pt with reported history of seizure disorder, after abruptly discontinuing benzodiazepines. Was previously on Keppra, which patient self-discontinued. No seizure activity noted while inpatient.   - reporting dryness of nasal passage previously an aura for the patient  - given nasal spray for symptomatic relief  - will monitor for now Resolved. Unlikely sequelae of APEP overdose.   - CT head wnl  resolved.

## 2017-09-21 NOTE — PROGRESS NOTE BEHAVIORAL HEALTH - SUMMARY
22 year-old woman with borderline personality disorder presenting after a serious suicide attempt overdosing on Tylenol in the setting of legal and substance abuse struggles. Mood today is improving and affect is euthymic. Patient agrees with inpatient treatment and completes voluntary paperwork. Plan to transfer to [place] once medically cleared.

## 2017-09-21 NOTE — PROGRESS NOTE ADULT - PROBLEM SELECTOR PLAN 1
Patient elevated acetaminophen levels per nomogram in ED, on continued NAC therapy despite low acetaminophen levels due to liver toxicity  - LFTs/INR now plateaued and downtrending; APEP level less than detectable  - will continue to monitor clinically and liver functions off NAC protocol   - psych on board; disposition to inpatient psych once medically stable

## 2017-09-21 NOTE — PROGRESS NOTE ADULT - SUBJECTIVE AND OBJECTIVE BOX
Patient is a 22y old  Female who presents with a chief complaint of Tylenol Ingestion (18 Sep 2017 19:45)      SUBJECTIVE / OVERNIGHT EVENTS:  No acute events overnight.     MEDICATIONS  (STANDING):  pantoprazole    Tablet 40 milliGRAM(s) Oral daily  clonazePAM Tablet 1 milliGRAM(s) Oral two times a day  influenza   Vaccine 0.5 milliLiter(s) IntraMuscular once  nicotine -   7 mG/24Hr(s) Patch 1 patch Transdermal daily  sodium chloride 0.9%. 1000 milliLiter(s) (125 mL/Hr) IV Continuous <Continuous>    MEDICATIONS  (PRN):  ondansetron    Tablet 4 milliGRAM(s) Oral every 8 hours PRN Nausea and/or Vomiting  aluminum hydroxide/magnesium hydroxide/simethicone Suspension 30 milliLiter(s) Oral every 6 hours PRN Dyspepsia  ondansetron Injectable 4 milliGRAM(s) IV Push every 6 hours PRN Nausea and/or Vomiting  metoclopramide Injectable 5 milliGRAM(s) IV Push every 8 hours PRN nausea and/or vomiting  sodium chloride 0.65% Nasal 1 Spray(s) Both Nostrils four times a day PRN Nasal Congestion        CAPILLARY BLOOD GLUCOSE        I&O's Summary    19 Sep 2017 07:01  -  20 Sep 2017 07:00  --------------------------------------------------------  IN: 750 mL / OUT: 0 mL / NET: 750 mL    20 Sep 2017 07:01  -  21 Sep 2017 05:25  --------------------------------------------------------  IN: 750 mL / OUT: 0 mL / NET: 750 mL      Vital Signs Last 24 Hrs  T(C): 36.7 (20 Sep 2017 21:32), Max: 37.7 (20 Sep 2017 05:38)  T(F): 98.1 (20 Sep 2017 21:32), Max: 99.8 (20 Sep 2017 05:38)  HR: 68 (20 Sep 2017 21:32) (68 - 85)  BP: 117/79 (20 Sep 2017 21:32) (109/66 - 117/79)  RR: 17 (20 Sep 2017 21:32) (17 - 18)  SpO2: 98% (20 Sep 2017 21:32) (95% - 98%)    PHYSICAL EXAM:  GENERAL: NAD, well-developed  HEAD:  Atraumatic, Normocephalic  EYES: EOMI, PERRLA, conjunctiva and sclera clear. No ptosis  NECK: Supple, No JVD  CHEST/LUNG: Clear to auscultation bilaterally; No wheeze  HEART: Regular rate and rhythm; No murmurs, rubs, or gallops  ABDOMEN: Soft, mild epigastric tenderness, Nondistended; Bowel sounds present  EXTREMITIES:  2+ Peripheral Pulses, No clubbing, cyanosis, or edema  PSYCH: AAOx3  NEUROLOGY: non-focal  SKIN: No rashes or lesions    LABS:                        13.8   7.96  )-----------( 184      ( 20 Sep 2017 06:10 )             39.8     09-21    141  |  107  |  5<L>  ----------------------------<  107<H>  3.6   |  20<L>  |  0.60    Ca    8.0<L>      21 Sep 2017 00:30  Phos  1.8     09-21  Mg     2.0     09-21    TPro  6.7  /  Alb  3.6  /  TBili  0.6  /  DBili  x   /  AST  631<H>  /  ALT  1896<H>  /  AlkPhos  52  09-21    PT/INR - ( 21 Sep 2017 00:30 )   PT: 17.4 SEC;   INR: 1.54          PTT - ( 19 Sep 2017 06:00 )  PTT:29.6 SEC  CARDIAC MARKERS ( 21 Sep 2017 00:30 )  x     / x     / 66 u/L / x     / x              RADIOLOGY & ADDITIONAL TESTS:    Imaging Personally Reviewed:    Consultant(s) Notes Reviewed:      Care Discussed with Consultants/Other Providers: Patient is a 22y old  Female who presents with a chief complaint of Tylenol Ingestion (18 Sep 2017 19:45)      SUBJECTIVE / OVERNIGHT EVENTS:  No acute events overnight. One transient episode of abdominal pain that resolved. Says nasal spray is improving burning sensation in nares. Reporting some ear pain, which patient attributed to not being able to clear her ears. Denies tinnitus, vertigo or sinus pain.    MEDICATIONS  (STANDING):  pantoprazole    Tablet 40 milliGRAM(s) Oral daily  clonazePAM Tablet 1 milliGRAM(s) Oral two times a day  influenza   Vaccine 0.5 milliLiter(s) IntraMuscular once  nicotine -   7 mG/24Hr(s) Patch 1 patch Transdermal daily  sodium chloride 0.9%. 1000 milliLiter(s) (125 mL/Hr) IV Continuous <Continuous>    MEDICATIONS  (PRN):  ondansetron    Tablet 4 milliGRAM(s) Oral every 8 hours PRN Nausea and/or Vomiting  aluminum hydroxide/magnesium hydroxide/simethicone Suspension 30 milliLiter(s) Oral every 6 hours PRN Dyspepsia  ondansetron Injectable 4 milliGRAM(s) IV Push every 6 hours PRN Nausea and/or Vomiting  metoclopramide Injectable 5 milliGRAM(s) IV Push every 8 hours PRN nausea and/or vomiting  sodium chloride 0.65% Nasal 1 Spray(s) Both Nostrils four times a day PRN Nasal Congestion        CAPILLARY BLOOD GLUCOSE        I&O's Summary    19 Sep 2017 07:01  -  20 Sep 2017 07:00  --------------------------------------------------------  IN: 750 mL / OUT: 0 mL / NET: 750 mL    20 Sep 2017 07:01  -  21 Sep 2017 05:25  --------------------------------------------------------  IN: 750 mL / OUT: 0 mL / NET: 750 mL      Vital Signs Last 24 Hrs  T(C): 36.7 (20 Sep 2017 21:32), Max: 37.7 (20 Sep 2017 05:38)  T(F): 98.1 (20 Sep 2017 21:32), Max: 99.8 (20 Sep 2017 05:38)  HR: 68 (20 Sep 2017 21:32) (68 - 85)  BP: 117/79 (20 Sep 2017 21:32) (109/66 - 117/79)  RR: 17 (20 Sep 2017 21:32) (17 - 18)  SpO2: 98% (20 Sep 2017 21:32) (95% - 98%)    PHYSICAL EXAM:  GENERAL: NAD, well-developed  HEAD:  Atraumatic, Normocephalic. No sinus tenderness  EYES: EOMI, PERRLA, conjunctiva and sclera clear. Minimal ptosis of the right eye.   NECK: Supple, No JVD  CHEST/LUNG: Clear to auscultation bilaterally; No wheeze  HEART: Regular rate and rhythm; No murmurs, rubs, or gallops  ABDOMEN: Soft, mild epigastric tenderness, Nondistended; Bowel sounds present  EXTREMITIES:  2+ Peripheral Pulses, No clubbing, cyanosis, or edema  PSYCH: AAOx3  NEUROLOGY: non-focal  SKIN: No rashes or lesions    LABS:                  12.1                 143  | 23   | 5            6.56  >-----------< 167     ------------------------< 83                    35.2                 3.6  | 109  | 0.55                                         Ca 7.9   Mg 1.9   Ph 2.3    LIVER FUNCTIONS - ( 21 Sep 2017 06:30 )  Alb: 3.2 g/dL / Pro: 6.1 g/dL / ALK PHOS: 46 u/L / ALT: 1645 u/L / AST: 423 u/L                             13.8   7.96  )-----------( 184      ( 20 Sep 2017 06:10 )             39.8     09-21    141  |  107  |  5<L>  ----------------------------<  107<H>  3.6   |  20<L>  |  0.60    Ca    8.0<L>      21 Sep 2017 00:30  Phos  1.8     09-21  Mg     2.0     09-21    TPro  6.7  /  Alb  3.6  /  TBili  0.6  /  DBili  x   /  AST  631<H>  /  ALT  1896<H>  /  AlkPhos  52  09-21    PT/INR - ( 21 Sep 2017 00:30 )   PT: 17.4 SEC;   INR: 1.54          PTT - ( 19 Sep 2017 06:00 )  PTT:29.6 SEC  CARDIAC MARKERS ( 21 Sep 2017 00:30 )  x     / x     / 66 u/L / x     / x              RADIOLOGY & ADDITIONAL TESTS:    Imaging Personally Reviewed:    Consultant(s) Notes Reviewed:      Care Discussed with Consultants/Other Providers: Patient is a 22y old  Female who presents with a chief complaint of Tylenol Ingestion (18 Sep 2017 19:45)      SUBJECTIVE / OVERNIGHT EVENTS:  No acute events overnight. One transient episode of abdominal pain that resolved. Says nasal spray is improving burning sensation in nares. Reporting some ear pain, which patient attributed to not being able to clear her ears. Denies tinnitus, vertigo or sinus pain.    MEDICATIONS  (STANDING):  pantoprazole    Tablet 40 milliGRAM(s) Oral daily  clonazePAM Tablet 1 milliGRAM(s) Oral two times a day  influenza   Vaccine 0.5 milliLiter(s) IntraMuscular once  nicotine -   7 mG/24Hr(s) Patch 1 patch Transdermal daily  sodium chloride 0.9%. 1000 milliLiter(s) (125 mL/Hr) IV Continuous <Continuous>    MEDICATIONS  (PRN):  ondansetron    Tablet 4 milliGRAM(s) Oral every 8 hours PRN Nausea and/or Vomiting  aluminum hydroxide/magnesium hydroxide/simethicone Suspension 30 milliLiter(s) Oral every 6 hours PRN Dyspepsia  ondansetron Injectable 4 milliGRAM(s) IV Push every 6 hours PRN Nausea and/or Vomiting  metoclopramide Injectable 5 milliGRAM(s) IV Push every 8 hours PRN nausea and/or vomiting  sodium chloride 0.65% Nasal 1 Spray(s) Both Nostrils four times a day PRN Nasal Congestion        CAPILLARY BLOOD GLUCOSE        I&O's Summary    19 Sep 2017 07:01  -  20 Sep 2017 07:00  --------------------------------------------------------  IN: 750 mL / OUT: 0 mL / NET: 750 mL    20 Sep 2017 07:01  -  21 Sep 2017 05:25  --------------------------------------------------------  IN: 750 mL / OUT: 0 mL / NET: 750 mL      Vital Signs Last 24 Hrs  T(C): 36.7 (20 Sep 2017 21:32), Max: 37.7 (20 Sep 2017 05:38)  T(F): 98.1 (20 Sep 2017 21:32), Max: 99.8 (20 Sep 2017 05:38)  HR: 68 (20 Sep 2017 21:32) (68 - 85)  BP: 117/79 (20 Sep 2017 21:32) (109/66 - 117/79)  RR: 17 (20 Sep 2017 21:32) (17 - 18)  SpO2: 98% (20 Sep 2017 21:32) (95% - 98%)    PHYSICAL EXAM:  GENERAL: NAD, well-developed  HEAD:  Atraumatic, Normocephalic. No sinus tenderness  EYES: EOMI, PERRLA, conjunctiva and sclera clear. Minimal ptosis of the right eye.   NECK: Supple, No JVD  CHEST/LUNG: Clear to auscultation bilaterally; No wheeze  HEART: Regular rate and rhythm; No murmurs, rubs, or gallops  ABDOMEN: Soft, mild epigastric tenderness, Nondistended; Bowel sounds present  EXTREMITIES:  2+ Peripheral Pulses, No clubbing, cyanosis, or edema  PSYCH: AAOx3  NEUROLOGY: non-focal  SKIN: No rashes or lesions    LABS:                  12.1                 143  | 23   | 5            6.56  >-----------< 167     ------------------------< 83                    35.2                 3.6  | 109  | 0.55                                         Ca 7.9   Mg 1.9   Ph 2.3    LIVER FUNCTIONS - ( 21 Sep 2017 06:30 )  Alb: 3.2 g/dL / Pro: 6.1 g/dL / ALK PHOS: 46 u/L / ALT: 1645 u/L / AST: 423 u/L                             13.8   7.96  )-----------( 184      ( 20 Sep 2017 06:10 )             39.8     09-21    141  |  107  |  5<L>  ----------------------------<  107<H>  3.6   |  20<L>  |  0.60    Ca    8.0<L>      21 Sep 2017 00:30  Phos  1.8     09-21  Mg     2.0     09-21    TPro  6.7  /  Alb  3.6  /  TBili  0.6  /  DBili  x   /  AST  631<H>  /  ALT  1896<H>  /  AlkPhos  52  09-21    PT/INR - ( 21 Sep 2017 00:30 )   PT: 17.4 SEC;   INR: 1.54          PTT - ( 19 Sep 2017 06:00 )  PTT:29.6 SEC  CARDIAC MARKERS ( 21 Sep 2017 00:30 )  x     / x     / 66 u/L / x     / x              RADIOLOGY & ADDITIONAL TESTS:    Imaging Personally Reviewed:    Consultant(s) Notes Reviewed:      Care Discussed with Consultants/Other Providers: Dr. Luevano (Psych), plan for inpatient psych.

## 2017-09-21 NOTE — PROGRESS NOTE ADULT - ASSESSMENT
21 YO F with history of borderline personality disorder and bipolar disorder on clonazepam presents with nausea, vomiting, abdominal pain, found to have drug-induced hepatitis, currently completed NAC protocol with donwtrending LFTs. Pending disposition to inpatient psych once medically stable 21 YO F with history of borderline personality disorder and bipolar disorder on clonazepam presents with nausea, vomiting, abdominal pain, found to have drug-induced hepatitis, currently completed NAC protocol with downtrending LFTs. Pending disposition to inpatient psych once medically stable

## 2017-09-21 NOTE — PROGRESS NOTE ADULT - PROBLEM SELECTOR PLAN 3
Patient reports history of BPAD on lithium that was self discontinued 1.5 mo ago c/w clonazepam  - No need to restart lithium per psych  - c/w clonazepam  - pending disposition to inpatient psych once acute medical condition resolved

## 2017-09-22 ENCOUNTER — INPATIENT (INPATIENT)
Facility: HOSPITAL | Age: 22
LOS: 16 days | Discharge: HOME | End: 2017-10-09
Attending: PSYCHIATRY & NEUROLOGY | Admitting: PSYCHIATRY & NEUROLOGY
Payer: COMMERCIAL

## 2017-09-22 ENCOUNTER — TRANSCRIPTION ENCOUNTER (OUTPATIENT)
Age: 22
End: 2017-09-22

## 2017-09-22 VITALS
TEMPERATURE: 98 F | OXYGEN SATURATION: 100 % | DIASTOLIC BLOOD PRESSURE: 88 MMHG | SYSTOLIC BLOOD PRESSURE: 124 MMHG | RESPIRATION RATE: 18 BRPM | HEART RATE: 74 BPM

## 2017-09-22 VITALS — RESPIRATION RATE: 18 BRPM | HEIGHT: 67 IN | TEMPERATURE: 99 F | WEIGHT: 179.9 LBS

## 2017-09-22 DIAGNOSIS — Z90.89 ACQUIRED ABSENCE OF OTHER ORGANS: Chronic | ICD-10-CM

## 2017-09-22 DIAGNOSIS — F31.0 BIPOLAR DISORDER, CURRENT EPISODE HYPOMANIC: ICD-10-CM

## 2017-09-22 LAB
ALBUMIN SERPL ELPH-MCNC: 3.5 G/DL — SIGNIFICANT CHANGE UP (ref 3.3–5)
ALP SERPL-CCNC: 45 U/L — SIGNIFICANT CHANGE UP (ref 40–120)
ALT FLD-CCNC: 1139 U/L — HIGH (ref 4–33)
APAP SERPL-MCNC: < 15 UG/ML — LOW (ref 15–25)
AST SERPL-CCNC: 167 U/L — HIGH (ref 4–32)
BASE EXCESS BLDV CALC-SCNC: -1.9 MMOL/L — SIGNIFICANT CHANGE UP
BILIRUB SERPL-MCNC: 0.4 MG/DL — SIGNIFICANT CHANGE UP (ref 0.2–1.2)
BUN SERPL-MCNC: 5 MG/DL — LOW (ref 7–23)
CALCIUM SERPL-MCNC: 8.1 MG/DL — LOW (ref 8.4–10.5)
CHLORIDE SERPL-SCNC: 111 MMOL/L — HIGH (ref 98–107)
CK SERPL-CCNC: 38 U/L — SIGNIFICANT CHANGE UP (ref 25–170)
CO2 SERPL-SCNC: 21 MMOL/L — LOW (ref 22–31)
CREAT SERPL-MCNC: 0.56 MG/DL — SIGNIFICANT CHANGE UP (ref 0.5–1.3)
GLUCOSE SERPL-MCNC: 116 MG/DL — HIGH (ref 70–99)
HCO3 BLDV-SCNC: 23 MMOL/L — SIGNIFICANT CHANGE UP (ref 20–27)
INR BLD: 1.17 — SIGNIFICANT CHANGE UP (ref 0.88–1.17)
LACTATE SERPL-SCNC: 1.3 MMOL/L — SIGNIFICANT CHANGE UP (ref 0.5–2)
PCO2 BLDV: 42 MMHG — SIGNIFICANT CHANGE UP (ref 41–51)
PH BLDV: 7.36 PH — SIGNIFICANT CHANGE UP (ref 7.32–7.43)
PO2 BLDV: 60 MMHG — HIGH (ref 35–40)
POTASSIUM SERPL-MCNC: 3.7 MMOL/L — SIGNIFICANT CHANGE UP (ref 3.5–5.3)
POTASSIUM SERPL-SCNC: 3.7 MMOL/L — SIGNIFICANT CHANGE UP (ref 3.5–5.3)
PROT SERPL-MCNC: 6.4 G/DL — SIGNIFICANT CHANGE UP (ref 6–8.3)
PROTHROM AB SERPL-ACNC: 13.2 SEC — HIGH (ref 9.8–13.1)
SAO2 % BLDV: 91.1 % — HIGH (ref 60–85)
SODIUM SERPL-SCNC: 144 MMOL/L — SIGNIFICANT CHANGE UP (ref 135–145)

## 2017-09-22 PROCEDURE — 99222 1ST HOSP IP/OBS MODERATE 55: CPT

## 2017-09-22 PROCEDURE — 99239 HOSP IP/OBS DSCHRG MGMT >30: CPT

## 2017-09-22 PROCEDURE — 99233 SBSQ HOSP IP/OBS HIGH 50: CPT

## 2017-09-22 RX ORDER — LANOLIN ALCOHOL/MO/W.PET/CERES
3 CREAM (GRAM) TOPICAL AT BEDTIME
Qty: 0 | Refills: 0 | Status: DISCONTINUED | OUTPATIENT
Start: 2017-09-22 | End: 2017-10-09

## 2017-09-22 RX ORDER — CLONAZEPAM 1 MG
1 TABLET ORAL
Qty: 0 | Refills: 0 | Status: DISCONTINUED | OUTPATIENT
Start: 2017-09-22 | End: 2017-09-24

## 2017-09-22 RX ORDER — NICOTINE POLACRILEX 2 MG
1 GUM BUCCAL DAILY
Qty: 0 | Refills: 0 | Status: DISCONTINUED | OUTPATIENT
Start: 2017-09-22 | End: 2017-10-09

## 2017-09-22 RX ORDER — PANTOPRAZOLE SODIUM 20 MG/1
40 TABLET, DELAYED RELEASE ORAL
Qty: 0 | Refills: 0 | Status: DISCONTINUED | OUTPATIENT
Start: 2017-09-22 | End: 2017-10-09

## 2017-09-22 RX ORDER — CLONAZEPAM 1 MG
1 TABLET ORAL
Qty: 0 | Refills: 0 | COMMUNITY
Start: 2017-09-22

## 2017-09-22 RX ORDER — CLONAZEPAM 1 MG
1 TABLET ORAL
Qty: 0 | Refills: 0 | COMMUNITY

## 2017-09-22 RX ORDER — HYDROXYZINE HCL 10 MG
50 TABLET ORAL EVERY 6 HOURS
Qty: 0 | Refills: 0 | Status: DISCONTINUED | OUTPATIENT
Start: 2017-09-22 | End: 2017-10-09

## 2017-09-22 RX ORDER — SODIUM CHLORIDE 0.65 %
1 AEROSOL, SPRAY (ML) NASAL
Qty: 0 | Refills: 0 | COMMUNITY
Start: 2017-09-22

## 2017-09-22 RX ORDER — PANTOPRAZOLE SODIUM 20 MG/1
1 TABLET, DELAYED RELEASE ORAL
Qty: 0 | Refills: 0 | COMMUNITY
Start: 2017-09-22

## 2017-09-22 RX ORDER — NICOTINE POLACRILEX 2 MG
1 GUM BUCCAL
Qty: 0 | Refills: 0 | COMMUNITY
Start: 2017-09-22

## 2017-09-22 RX ADMIN — Medication 1 MILLIGRAM(S): at 20:49

## 2017-09-22 RX ADMIN — PANTOPRAZOLE SODIUM 40 MILLIGRAM(S): 20 TABLET, DELAYED RELEASE ORAL at 13:33

## 2017-09-22 RX ADMIN — Medication 1 TABLET(S): at 09:15

## 2017-09-22 RX ADMIN — Medication 3 MILLIGRAM(S): at 21:45

## 2017-09-22 RX ADMIN — SODIUM CHLORIDE 125 MILLILITER(S): 9 INJECTION INTRAMUSCULAR; INTRAVENOUS; SUBCUTANEOUS at 02:05

## 2017-09-22 RX ADMIN — Medication 1 MILLIGRAM(S): at 05:41

## 2017-09-22 RX ADMIN — Medication 1 PATCH: at 13:35

## 2017-09-22 RX ADMIN — Medication 1 PATCH: at 13:33

## 2017-09-22 NOTE — DISCHARGE NOTE ADULT - ADDITIONAL INSTRUCTIONS
Please continue to trend: CMP, ALT, AST, and INR.  Please avoid intoxication with prescribed and over the counter medications.  Please follow up with your primary care physican, Dr. Berrios

## 2017-09-22 NOTE — PROGRESS NOTE BEHAVIORAL HEALTH - NSBHCONSFOLLOWNEEDS_PSY_A_CORE
Patient needs further psychiatric safety assessment prior to discharge

## 2017-09-22 NOTE — PROGRESS NOTE BEHAVIORAL HEALTH - NSBHFUPINTERVALHXFT_PSY_A_CORE
Pt seen on follow up. Chart reviewed. Per chart, patient has been medically cleared by primary team for transfer to [facility]. On interview today, patient is A+Ox3 and awaiting to be transferred. Patient states that she feels good. Denies current SI.     Currently looking for a bed at [facility] for transfer. Voluntary paperwork has been signed. Pt seen on follow up. Chart reviewed. Per chart, patient has been medically cleared by primary team for transfer to [facility]. On interview today, patient is A+Ox3 and awaiting to be transferred. Patient states that she feels good. Denies current SI. Voluntary paperwork has been signed.

## 2017-09-22 NOTE — PROGRESS NOTE ADULT - ATTENDING COMMENTS
Pt seen and examined with HS on above date.  Agree with above HS note.  Plan discussed with House staff.    22 F with intentional tylenol overdose, signs of hepatic toxicity.  Monitor LFTs, INR.  1 to 1.  psych consult
Pt seen and examined with HS on above date.  Agree with above HS note.  Plan discussed with House staff.    22 F a/w suicide attempt with OD of tylenol resulting in hepatoxicity.  LFTs improving, stable for dc to inpatient Psych pending bed availability.  DC time 45 minutes.
Pt seen and examined with HS on above date.  Agree with above HS note.  Plan discussed with House staff.    Tylenol OD from suicidal intent.  LFTs continue to rise.  c/w NAC, monitor for liver failure
Pt seen and examined with HS on above date.  Agree with above HS note.  Plan discussed with House staff.    22 f with suicide attempt and liver toxicity from tylenol OD.  continue to trend LFTS, INR. 1 to 1 for suicidality. DC planning.

## 2017-09-22 NOTE — PROGRESS NOTE ADULT - PROBLEM SELECTOR PLAN 2
Likely 2/2 tylenol ingestion, although patient reports some blood tinge to emesis this was only on episode and after extensive vomiting. Patient remains VS stable with normal Hgb and only mildly elevated LFTs.  - 100 ml/hr NS for 12 hr overnight  - Protonix 40 mg PO daily, zofran 4 mg PO q8 PRN
Likely 2/2 tylenol ingestion.   - Protonix 40 mg PO daily, zofran 4 mg IV q8 PRN, reglan 5 mg IV q8 prn

## 2017-09-22 NOTE — DISCHARGE NOTE ADULT - HOSPITAL COURSE
23 YO F with history of borderline PD, bipolar, seizure disorder currently treated with clonazepam presents with abdominal pain, NBNB nausea and vomiting after intentional ingestion of nearly 100 Tylenol pills due to several life stressors. In the ED, vitals were: T36.7, HR89 /81 RR19 100%. Given blood Tylenol level of 38 at 20 hrs after ingestion, patient was initiated on N-acetylcysteine therapy and 1:1 constant observation. She was given standard 21 hours of therapy; however she was given additional 32 hours of therapy due to uptrending LFTs. After LFTs plateaued peak levels of AST:1761 ALT: 2726 and INR 1.75, NAC protocol was discontinued and LFTs have since downtrending with supportive IVF. Of note 24 hours into hospital course, patient had episode of right lid ptosis, and right sided paresthesias. CT head was negative for acute intracranial pathology. Symptoms resolved in 12 hours.     Psych consult was obtained and patient was agreeable to voluntary inpatient psychiatric admission. On morning of discharge, LFTs downtrended to AST of 167 and ALT of 1139. Patient asymptomatic and mentating well. She is medically stable to transfer to [facility] for inpatient psych admission.

## 2017-09-22 NOTE — PROGRESS NOTE ADULT - PROBLEM SELECTOR PLAN 4
Likely due to cerumen buildup, given pt has had sx relieved by ear cleaning. No s/s of sinusitis or otitis.   - reports mother will bring in home regimen supplies today

## 2017-09-22 NOTE — PROGRESS NOTE ADULT - PROBLEM SELECTOR PLAN 8
No DVT ppx required as pt is otherwise young, healthy, and ambulating
Pt with reported history of seizure disorder, after abruptly discontinuing benzodiazepines. Was previously on Keppra, which patient self-discontinued. No seizure activity noted while inpatient.   - reporting dryness of nasal passage previously an aura for the patient  - given nasal spray for symptomatic relief  - will monitor for now

## 2017-09-22 NOTE — PROGRESS NOTE ADULT - PROBLEM SELECTOR PLAN 7
Pt with reported history of seizure disorder, after abruptly discontinuing benzodiazepines. Was previously on Keppra, which patient self-discontinued.   - With occasional complaint of burning sensation/strange smell previously associate aura prior to seizure  - No seizure activity noted while inpatient  - c/w nasal spray for irritation of nares  - continue to monitor

## 2017-09-22 NOTE — PROGRESS NOTE BEHAVIORAL HEALTH - SUMMARY
22 year-old woman with borderline personality disorder presenting after a serious suicide attempt overdosing on Tylenol in the setting of legal and substance abuse struggles. Mood today is improving and affect is euthymic. Patient agreed to inpatient treatment to [facility] and completed voluntary paper work yesterday. Patient has been medically cleared and will be transferred to [facility] when bed is available. 22 year-old woman with borderline personality disorder presenting after a serious suicide attempt overdosing on Tylenol in the setting of legal and substance abuse struggles. Mood today is improving and affect is euthymic. Patient agreed to inpatient treatment to [facility] and completed voluntary paper work yesterday. Patient has been medically cleared and will be transferred to [facility] today.

## 2017-09-22 NOTE — DISCHARGE NOTE ADULT - MEDICATION SUMMARY - MEDICATIONS TO TAKE
I will START or STAY ON the medications listed below when I get home from the hospital:    aluminum hydroxide-magnesium hydroxide 200 mg-200 mg/5 mL oral suspension  -- 30 milliliter(s) by mouth every 6 hours, As needed, Dyspepsia  -- Indication: For Abdominal     clonazePAM 1 mg oral tablet  -- 1 tab(s) by mouth 2 times a day  -- Indication: For Bipolar 1 disorder    [place] Saline Mist 0.65% nasal spray  -- 1 spray(s) in each affected nostril 3 times a day  -- Indication: For Nasal mucosa dryness    pantoprazole 40 mg oral delayed release tablet  -- 1 tab(s) by mouth once a day  -- Indication: For Gastritis    nicotine 7 mg/24 hr transdermal film, extended release  -- 1  by transdermal patch once a day  -- Indication: For nicotine dependence

## 2017-09-22 NOTE — PROGRESS NOTE BEHAVIORAL HEALTH - NSBHCONSULTFOLLOWDETAILS_PSY_A_CORE FT
will require inpatient psychiatric treatment once medically cleared
will require inpatient psychiatric treatment once medically cleared
Has been medically cleared and will be transferred to [facility] when bed is available.

## 2017-09-22 NOTE — PROGRESS NOTE BEHAVIORAL HEALTH - NSBHCHARTREVIEWVS_PSY_A_CORE FT
Vital Signs Last 24 Hrs  T(C): 36.6 (22 Sep 2017 05:37), Max: 36.6 (22 Sep 2017 05:37)  T(F): 97.8 (22 Sep 2017 05:37), Max: 97.8 (22 Sep 2017 05:37)  HR: 67 (22 Sep 2017 05:37) (60 - 74)  BP: 109/61 (22 Sep 2017 05:37) (101/71 - 109/61)  BP(mean): --  RR: 18 (22 Sep 2017 05:37) (16 - 18)  SpO2: 99% (22 Sep 2017 05:37) (97% - 99%)
ICU Vital Signs Last 24 Hrs  T(C): 37.7 (20 Sep 2017 05:38), Max: 38.3 (19 Sep 2017 21:19)  T(F): 99.8 (20 Sep 2017 05:38), Max: 101 (19 Sep 2017 21:19)  HR: 85 (20 Sep 2017 05:38) (80 - 85)  BP: 109/66 (20 Sep 2017 05:38) (109/66 - 112/72)  BP(mean): --  ABP: --  ABP(mean): --  RR: 18 (20 Sep 2017 05:38) (17 - 18)  SpO2: 98% (20 Sep 2017 05:38) (98% - 99%)
ICU Vital Signs Last 24 Hrs  T(C): 36.7 (21 Sep 2017 05:27), Max: 37.4 (20 Sep 2017 13:58)  T(F): 98.1 (21 Sep 2017 05:27), Max: 99.4 (20 Sep 2017 13:58)  HR: 75 (21 Sep 2017 05:27) (68 - 83)  BP: 111/67 (21 Sep 2017 05:27) (111/67 - 117/79)  BP(mean): --  ABP: --  ABP(mean): --  RR: 18 (21 Sep 2017 05:27) (17 - 18)  SpO2: 97% (21 Sep 2017 05:27) (95% - 98%)

## 2017-09-22 NOTE — PROGRESS NOTE ADULT - PROBLEM SELECTOR PROBLEM 1
Tylenol overdose, intentional self-harm, initial encounter

## 2017-09-22 NOTE — PROGRESS NOTE BEHAVIORAL HEALTH - RISK ASSESSMENT
Elevated risk due to diagnosis of borderline personality disorder, history of suicide attempts, substance abuse. Protective factors are strong support network, being in a relationship, being future-oriented, denying SI presently.

## 2017-09-22 NOTE — DISCHARGE NOTE ADULT - CARE PLAN
Principal Discharge DX:	Tylenol overdose, intentional self-harm, initial encounter  Goal:	Prevention of further episodes.  Instructions for follow-up, activity and diet:	You came in with abdominal pain and vomiting, after overdosing on Tylenol PM. Tylenol is very toxic to the liver. You were started on IV medication to speed up elimination of tylenol out of your system. We have been monitoring your liver function, which has improved. Please avoid taking tylenol or any over the counter, prescribed medications or illicit drugs in excess as it will be dangerous to your health.  Secondary Diagnosis:	Bipolar 1 disorder  Goal:	Continue clonazepam as prescribed  Secondary Diagnosis:	Ptosis of right eyelid  Goal:	Resolved  Instructions for follow-up, activity and diet:	You had lag of your right lid, which resolved. A CT scan was done to make sure that there was no bleed or poor blood perfusion, which was negative.  Secondary Diagnosis:	Seizure disorder  Goal:	Monitoring  Instructions for follow-up, activity and diet:	You did not have any seizures since your hospital admission. We will continue to monitor you off the keppra. Please take the clonazepam as prescribed to prevent withdrawal seizures.

## 2017-09-22 NOTE — PROGRESS NOTE ADULT - SUBJECTIVE AND OBJECTIVE BOX
Patient is a 22y old  Female who presents with a chief complaint of Tylenol Ingestion (18 Sep 2017 19:45)      SUBJECTIVE / OVERNIGHT EVENTS:  No acute events overnight. No complaints.     MEDICATIONS  (STANDING):  pantoprazole    Tablet 40 milliGRAM(s) Oral daily  clonazePAM Tablet 1 milliGRAM(s) Oral two times a day  influenza   Vaccine 0.5 milliLiter(s) IntraMuscular once  nicotine -   7 mG/24Hr(s) Patch 1 patch Transdermal daily  sodium chloride 0.9%. 1000 milliLiter(s) (125 mL/Hr) IV Continuous <Continuous>  potassium acid phosphate/sodium acid phosphate tablet (K-PHOS No. 2) 1 Tablet(s) Oral four times a day with meals    MEDICATIONS  (PRN):  ondansetron    Tablet 4 milliGRAM(s) Oral every 8 hours PRN Nausea and/or Vomiting  aluminum hydroxide/magnesium hydroxide/simethicone Suspension 30 milliLiter(s) Oral every 6 hours PRN Dyspepsia  ondansetron Injectable 4 milliGRAM(s) IV Push every 6 hours PRN Nausea and/or Vomiting  metoclopramide Injectable 5 milliGRAM(s) IV Push every 8 hours PRN nausea and/or vomiting  sodium chloride 0.65% Nasal 1 Spray(s) Both Nostrils four times a day PRN Nasal Congestion        CAPILLARY BLOOD GLUCOSE        I&O's Summary    20 Sep 2017 07:01  -  21 Sep 2017 07:00  --------------------------------------------------------  IN: 750 mL / OUT: 0 mL / NET: 750 mL    Vital Signs Last 24 Hrs  T(C): 36.5 (21 Sep 2017 21:08), Max: 36.5 (21 Sep 2017 21:08)  T(F): 97.7 (21 Sep 2017 21:08), Max: 97.7 (21 Sep 2017 21:08)  HR: 74 (21 Sep 2017 21:08) (60 - 74)  BP: 104/66 (21 Sep 2017 21:08) (101/71 - 104/66)  BP(mean): --  RR: 16 (21 Sep 2017 21:08) (16 - 16)  SpO2: 97% (21 Sep 2017 21:08) (97% - 98%)    PHYSICAL EXAM:  GENERAL: NAD, well-developed  HEAD:  Atraumatic, Normocephalic  EYES: Heavy lidded, right more than left. EOMI, PERRL, conjunctiva and sclera clear  NECK: Supple, No JVD  CHEST/LUNG: Clear to auscultation bilaterally; No wheeze  HEART: Regular rate and rhythm; No murmurs, rubs, or gallops  ABDOMEN: Soft, Nontender, Nondistended; Bowel sounds present  EXTREMITIES:  2+ Peripheral Pulses, No clubbing, cyanosis, or edema  PSYCH: AAOx3  NEUROLOGY: non-focal  SKIN: No rashes or lesions    LABS:                        12.1   6.56  )-----------( 167      ( 21 Sep 2017 06:30 )             35.2     09-21    143  |  109<H>  |  5<L>  ----------------------------<  83  3.6   |  23  |  0.55    Ca    7.9<L>      21 Sep 2017 06:30  Phos  2.3     09-21  Mg     1.9     09-21    TPro  6.1  /  Alb  3.2<L>  /  TBili  0.7  /  DBili  x   /  AST  423<H>  /  ALT  1645<H>  /  AlkPhos  46  09-21    PT/INR - ( 21 Sep 2017 06:30 )   PT: 15.8 SEC;   INR: 1.40            CARDIAC MARKERS ( 21 Sep 2017 06:30 )  x     / x     / 50 u/L / x     / x      CARDIAC MARKERS ( 21 Sep 2017 00:30 )  x     / x     / 66 u/L / x     / x              RADIOLOGY & ADDITIONAL TESTS:    Imaging Personally Reviewed:    Consultant(s) Notes Reviewed:      Care Discussed with Consultants/Other Providers: Patient is a 22y old  Female who presents with a chief complaint of Tylenol Ingestion (18 Sep 2017 19:45)      SUBJECTIVE / OVERNIGHT EVENTS:  No acute events overnight. No complaints.     MEDICATIONS  (STANDING):  pantoprazole    Tablet 40 milliGRAM(s) Oral daily  clonazePAM Tablet 1 milliGRAM(s) Oral two times a day  influenza   Vaccine 0.5 milliLiter(s) IntraMuscular once  nicotine -   7 mG/24Hr(s) Patch 1 patch Transdermal daily  sodium chloride 0.9%. 1000 milliLiter(s) (125 mL/Hr) IV Continuous <Continuous>  potassium acid phosphate/sodium acid phosphate tablet (K-PHOS No. 2) 1 Tablet(s) Oral four times a day with meals    MEDICATIONS  (PRN):  ondansetron    Tablet 4 milliGRAM(s) Oral every 8 hours PRN Nausea and/or Vomiting  aluminum hydroxide/magnesium hydroxide/simethicone Suspension 30 milliLiter(s) Oral every 6 hours PRN Dyspepsia  ondansetron Injectable 4 milliGRAM(s) IV Push every 6 hours PRN Nausea and/or Vomiting  metoclopramide Injectable 5 milliGRAM(s) IV Push every 8 hours PRN nausea and/or vomiting  sodium chloride 0.65% Nasal 1 Spray(s) Both Nostrils four times a day PRN Nasal Congestion        CAPILLARY BLOOD GLUCOSE        I&O's Summary    20 Sep 2017 07:01  -  21 Sep 2017 07:00  --------------------------------------------------------  IN: 750 mL / OUT: 0 mL / NET: 750 mL    Vital Signs Last 24 Hrs  T(C): 36.5 (21 Sep 2017 21:08), Max: 36.5 (21 Sep 2017 21:08)  T(F): 97.7 (21 Sep 2017 21:08), Max: 97.7 (21 Sep 2017 21:08)  HR: 74 (21 Sep 2017 21:08) (60 - 74)  BP: 104/66 (21 Sep 2017 21:08) (101/71 - 104/66)  BP(mean): --  RR: 16 (21 Sep 2017 21:08) (16 - 16)  SpO2: 97% (21 Sep 2017 21:08) (97% - 98%)    PHYSICAL EXAM:  GENERAL: NAD, well-developed  HEAD:  Atraumatic, Normocephalic  EYES: Heavy lidded, right more than left. EOMI, PERRL, conjunctiva and sclera clear  NECK: Supple, No JVD  CHEST/LUNG: Clear to auscultation bilaterally; No wheeze  HEART: Regular rate and rhythm; No murmurs, rubs, or gallops  ABDOMEN: Soft, Nontender, Minimal epigastric tenderness; Bowel sounds present  EXTREMITIES:  2+ Peripheral Pulses, No clubbing, cyanosis, or edema  PSYCH: AAOx3  NEUROLOGY: non-focal  SKIN: No rashes or lesions    LABS:     144  | 21   | 5            ------------------------< 116       3.7  | 111  | 0.56           Ca 8.1   Mg x     Ph x        LIVER FUNCTIONS - ( 22 Sep 2017 05:32 )  Alb: 3.5 g/dL / Pro: 6.4 g/dL / ALK PHOS: 45 u/L / ALT: 1139 u/L / AST: 167 u/L     Creatine Kinase, Serum: 38   Blood Gas Profile - Venous (09.22.17 @ 05:35)    pH, Venous: 7.36 pH    pCO2, Venous: 42 mmHg    pO2, Venous: 60 mmHg    HCO3, Venous: 23 mmol/L    Base Excess, Venous: -1.9: REFERENCE RANGE = -3 + 2 mmol/L mmol/L    Oxygen Saturation, Venous: 91.1 %      Acetaminophen Level, Serum (09.22.17 @ 05:33)    Acetaminophen Level, Serum: < 15.0                          12.1   6.56  )-----------( 167      ( 21 Sep 2017 06:30 )             35.2     09-21    143  |  109<H>  |  5<L>  ----------------------------<  83  3.6   |  23  |  0.55    Ca    7.9<L>      21 Sep 2017 06:30  Phos  2.3     09-21  Mg     1.9     09-21    TPro  6.1  /  Alb  3.2<L>  /  TBili  0.7  /  DBili  x   /  AST  423<H>  /  ALT  1645<H>  /  AlkPhos  46  09-21    PT/INR - ( 21 Sep 2017 06:30 )   PT: 15.8 SEC;   INR: 1.40                RADIOLOGY & ADDITIONAL TESTS:    Imaging Personally Reviewed:    Consultant(s) Notes Reviewed:      Care Discussed with Consultants/Other Providers: Patient is a 22y old  Female who presents with a chief complaint of Tylenol Ingestion (18 Sep 2017 19:45)      SUBJECTIVE / OVERNIGHT EVENTS:  No acute events overnight. No complaints.     MEDICATIONS  (STANDING):  pantoprazole    Tablet 40 milliGRAM(s) Oral daily  clonazePAM Tablet 1 milliGRAM(s) Oral two times a day  influenza   Vaccine 0.5 milliLiter(s) IntraMuscular once  nicotine -   7 mG/24Hr(s) Patch 1 patch Transdermal daily  sodium chloride 0.9%. 1000 milliLiter(s) (125 mL/Hr) IV Continuous <Continuous>  potassium acid phosphate/sodium acid phosphate tablet (K-PHOS No. 2) 1 Tablet(s) Oral four times a day with meals    MEDICATIONS  (PRN):  ondansetron    Tablet 4 milliGRAM(s) Oral every 8 hours PRN Nausea and/or Vomiting  aluminum hydroxide/magnesium hydroxide/simethicone Suspension 30 milliLiter(s) Oral every 6 hours PRN Dyspepsia  ondansetron Injectable 4 milliGRAM(s) IV Push every 6 hours PRN Nausea and/or Vomiting  metoclopramide Injectable 5 milliGRAM(s) IV Push every 8 hours PRN nausea and/or vomiting  sodium chloride 0.65% Nasal 1 Spray(s) Both Nostrils four times a day PRN Nasal Congestion        CAPILLARY BLOOD GLUCOSE        I&O's Summary    20 Sep 2017 07:01  -  21 Sep 2017 07:00  --------------------------------------------------------  IN: 750 mL / OUT: 0 mL / NET: 750 mL    Vital Signs Last 24 Hrs  T(C): 36.5 (21 Sep 2017 21:08), Max: 36.5 (21 Sep 2017 21:08)  T(F): 97.7 (21 Sep 2017 21:08), Max: 97.7 (21 Sep 2017 21:08)  HR: 74 (21 Sep 2017 21:08) (60 - 74)  BP: 104/66 (21 Sep 2017 21:08) (101/71 - 104/66)  BP(mean): --  RR: 16 (21 Sep 2017 21:08) (16 - 16)  SpO2: 97% (21 Sep 2017 21:08) (97% - 98%)    PHYSICAL EXAM:  GENERAL: NAD, well-developed  HEAD:  Atraumatic, Normocephalic  EYES: Heavy lidded, right more than left. EOMI, PERRL, conjunctiva and sclera clear  NECK: Supple, No JVD  CHEST/LUNG: Clear to auscultation bilaterally; No wheeze  HEART: Regular rate and rhythm; No murmurs, rubs, or gallops  ABDOMEN: Soft, Nontender, Minimal epigastric tenderness; Bowel sounds present  EXTREMITIES:  2+ Peripheral Pulses, No clubbing, cyanosis, or edema  PSYCH: AAOx3  NEUROLOGY: non-focal  SKIN: No rashes or lesions    LABS:     144  | 21   | 5            ------------------------< 116       3.7  | 111  | 0.56           Ca 8.1   Mg x     Ph x        LIVER FUNCTIONS - ( 22 Sep 2017 05:32 )  Alb: 3.5 g/dL / Pro: 6.4 g/dL / ALK PHOS: 45 u/L / ALT: 1139 u/L / AST: 167 u/L     Creatine Kinase, Serum: 38   Blood Gas Profile - Venous (09.22.17 @ 05:35)    pH, Venous: 7.36 pH    pCO2, Venous: 42 mmHg    pO2, Venous: 60 mmHg    HCO3, Venous: 23 mmol/L    Base Excess, Venous: -1.9: REFERENCE RANGE = -3 + 2 mmol/L mmol/L    Oxygen Saturation, Venous: 91.1 %      Acetaminophen Level, Serum (09.22.17 @ 05:33)    Acetaminophen Level, Serum: < 15.0                          12.1   6.56  )-----------( 167      ( 21 Sep 2017 06:30 )             35.2     09-21    143  |  109<H>  |  5<L>  ----------------------------<  83  3.6   |  23  |  0.55    Ca    7.9<L>      21 Sep 2017 06:30  Phos  2.3     09-21  Mg     1.9     09-21    TPro  6.1  /  Alb  3.2<L>  /  TBili  0.7  /  DBili  x   /  AST  423<H>  /  ALT  1645<H>  /  AlkPhos  46  09-21    PT/INR - ( 21 Sep 2017 06:30 )   PT: 15.8 SEC;   INR: 1.40                RADIOLOGY & ADDITIONAL TESTS:    Imaging Personally Reviewed:    Consultant(s) Notes Reviewed:      Care Discussed with Consultants/Other Providers: Dr Abdalla, (Psych), stable for transfer to inpatient psych

## 2017-09-22 NOTE — DISCHARGE NOTE ADULT - CARE PROVIDER_API CALL
Kolby Berrios (DO), Pediatrics  [facility] [place] [address]  Phone: [phone]  Fax: [phone]

## 2017-09-22 NOTE — PROGRESS NOTE BEHAVIORAL HEALTH - NSBHCHARTREVIEWLAB_PSY_A_CORE FT
CBC Full  -  ( 21 Sep 2017 06:30 )  WBC Count : 6.56 K/uL  Hemoglobin : 12.1 g/dL  Hematocrit : 35.2 %  Platelet Count - Automated : 167 K/uL  Mean Cell Volume : 82.1 fL  Mean Cell Hemoglobin : 28.2 pg  Mean Cell Hemoglobin Concentration : 34.4 %  Auto Neutrophil # : x  Auto Lymphocyte # : x  Auto Monocyte # : x  Auto Eosinophil # : x  Auto Basophil # : x  Auto Neutrophil % : x  Auto Lymphocyte % : x  Auto Monocyte % : x  Auto Eosinophil % : x  Auto Basophil % : x  09-22    144  |  111<H>  |  5<L>  ----------------------------<  116<H>  3.7   |  21<L>  |  0.56    Ca    8.1<L>      22 Sep 2017 05:32  Phos  2.3     09-21  Mg     1.9     09-21    TPro  6.4  /  Alb  3.5  /  TBili  0.4  /  DBili  x   /  AST  167<H>  /  ALT  1139<H>  /  AlkPhos  45  09-22
CBC Full  -  ( 20 Sep 2017 06:10 )  WBC Count : 7.96 K/uL  Hemoglobin : 13.8 g/dL  Hematocrit : 39.8 %  Platelet Count - Automated : 184 K/uL  Mean Cell Volume : 82.7 fL  Mean Cell Hemoglobin : 28.7 pg  Mean Cell Hemoglobin Concentration : 34.7 %  Auto Neutrophil # : x  Auto Lymphocyte # : x  Auto Monocyte # : x  Auto Eosinophil # : x  Auto Basophil # : x  Auto Neutrophil % : x  Auto Lymphocyte % : x  Auto Monocyte % : x  Auto Eosinophil % : x  Auto Basophil % : x  09-20    141  |  105  |  6<L>  ----------------------------<  92  3.5   |  22  |  0.74    Ca    8.3<L>      20 Sep 2017 06:10  Phos  2.1     09-20  Mg     2.0     09-20    TPro  6.6  /  Alb  3.7  /  TBili  0.7  /  DBili  x   /  AST  1761<H>  /  ALT  2726<H>  /  AlkPhos  57  09-20
CBC Full  -  ( 21 Sep 2017 06:30 )  WBC Count : 6.56 K/uL  Hemoglobin : 12.1 g/dL  Hematocrit : 35.2 %  Platelet Count - Automated : 167 K/uL  Mean Cell Volume : 82.1 fL  Mean Cell Hemoglobin : 28.2 pg  Mean Cell Hemoglobin Concentration : 34.4 %  Auto Neutrophil # : x  Auto Lymphocyte # : x  Auto Monocyte # : x  Auto Eosinophil # : x  Auto Basophil # : x  Auto Neutrophil % : x  Auto Lymphocyte % : x  Auto Monocyte % : x  Auto Eosinophil % : x  Auto Basophil % : x  09-21    143  |  109<H>  |  5<L>  ----------------------------<  83  3.6   |  23  |  0.55    Ca    7.9<L>      21 Sep 2017 06:30  Phos  2.3     09-21  Mg     1.9     09-21    TPro  6.1  /  Alb  3.2<L>  /  TBili  0.7  /  DBili  x   /  AST  423<H>  /  ALT  1645<H>  /  AlkPhos  46  09-21

## 2017-09-22 NOTE — DISCHARGE NOTE ADULT - PATIENT PORTAL LINK FT
“You can access the [employer] Patient Portal, offered by [facility], by registering with the following website: [email or web address]”

## 2017-09-22 NOTE — PROGRESS NOTE BEHAVIORAL HEALTH - NSBHCONSULTRECOMMENDOTHER_PSY_A_CORE FT
Voluntary paperwork for admission to [place] in the front of the patient's chart
Voluntary paperwork for admission to [place] in the front of the patient's chart

## 2017-09-22 NOTE — PROGRESS NOTE BEHAVIORAL HEALTH - NSBHADMITCOUNSEL_PSY_A_CORE
instructions for management, treatment and follow up/client/family/caregiver education
instructions for management, treatment and follow up/client/family/caregiver education

## 2017-09-22 NOTE — PROGRESS NOTE ADULT - PROBLEM SELECTOR PLAN 1
Patient overdosed on 60 tablets with elevated acetaminophen levels above nomogram threshold, now s/p NAC  - APEP level less than detectable  - LFTs/INR now plateaued and downtrending x 48 hrs  - psych on board; disposition to inpatient psych once medically stable Patient overdosed on 60 tablets with elevated acetaminophen levels above nomogram threshold, now s/p NAC  - APEP level less than detectable  - LFTs/INR now plateaued and downtrending x 48 hrs  - psych on board; disposition to inpatient psych   - medically stable for transfer

## 2017-09-22 NOTE — PROGRESS NOTE ADULT - ASSESSMENT
23 YO F with history of borderline personality disorder and bipolar disorder on clonazepam presents with nausea, vomiting, abdominal pain, found to have drug-induced hepatitis, currently completed NAC protocol with downtrending LFTs. Pending disposition to inpatient psych once medically stable 21 YO F with history of borderline personality disorder and bipolar disorder on clonazepam presents with nausea, vomiting, abdominal pain, found to have drug-induced hepatitis, currently completed NAC protocol with downtrending LFTs

## 2017-09-22 NOTE — DISCHARGE NOTE ADULT - PLAN OF CARE
Monitoring You did not have any seizures since your hospital admission. We will continue to monitor you off the keppra. Please take the clonazepam as prescribed to prevent withdrawal seizures. Prevention of further episodes. You came in with abdominal pain and vomiting, after overdosing on Tylenol PM. Tylenol is very toxic to the liver. You were started on IV medication to speed up elimination of tylenol out of your system. We have been monitoring your liver function, which has improved. Please avoid taking tylenol or any over the counter, prescribed medications or illicit drugs in excess as it will be dangerous to your health. Continue clonazepam as prescribed Resolved You had lag of your right lid, which resolved. A CT scan was done to make sure that there was no bleed or poor blood perfusion, which was negative.

## 2017-09-22 NOTE — DISCHARGE NOTE ADULT - CONDITIONS AT DISCHARGE
Patient  is stable enough for transfer to [facility], she is alert and oriented x 4, is independent with her care.  She remains on 1:1 for suicide attempt.  Summaries and Legal Papers are transferred with her.

## 2017-09-23 LAB
ALBUMIN SERPL ELPH-MCNC: 4.1 G/DL — SIGNIFICANT CHANGE UP (ref 3.3–5)
ALP SERPL-CCNC: 50 U/L — SIGNIFICANT CHANGE UP (ref 40–120)
ALT FLD-CCNC: 896 U/L — HIGH (ref 4–33)
AST SERPL-CCNC: 95 U/L — HIGH (ref 4–32)
BILIRUB SERPL-MCNC: 0.6 MG/DL — SIGNIFICANT CHANGE UP (ref 0.2–1.2)
BUN SERPL-MCNC: 8 MG/DL — SIGNIFICANT CHANGE UP (ref 7–23)
CALCIUM SERPL-MCNC: 9.1 MG/DL — SIGNIFICANT CHANGE UP (ref 8.4–10.5)
CHLORIDE SERPL-SCNC: 105 MMOL/L — SIGNIFICANT CHANGE UP (ref 98–107)
CO2 SERPL-SCNC: 24 MMOL/L — SIGNIFICANT CHANGE UP (ref 22–31)
CREAT SERPL-MCNC: 0.66 MG/DL — SIGNIFICANT CHANGE UP (ref 0.5–1.3)
GLUCOSE SERPL-MCNC: 71 MG/DL — SIGNIFICANT CHANGE UP (ref 70–99)
PHOSPHATE SERPL-MCNC: 4.1 MG/DL — SIGNIFICANT CHANGE UP (ref 2.5–4.5)
POTASSIUM SERPL-MCNC: 4 MMOL/L — SIGNIFICANT CHANGE UP (ref 3.5–5.3)
POTASSIUM SERPL-SCNC: 4 MMOL/L — SIGNIFICANT CHANGE UP (ref 3.5–5.3)
PROT SERPL-MCNC: 7.4 G/DL — SIGNIFICANT CHANGE UP (ref 6–8.3)
SODIUM SERPL-SCNC: 144 MMOL/L — SIGNIFICANT CHANGE UP (ref 135–145)

## 2017-09-23 PROCEDURE — 99223 1ST HOSP IP/OBS HIGH 75: CPT

## 2017-09-23 RX ORDER — LITHIUM CARBONATE 300 MG/1
600 TABLET, EXTENDED RELEASE ORAL AT BEDTIME
Qty: 0 | Refills: 0 | Status: DISCONTINUED | OUTPATIENT
Start: 2017-09-23 | End: 2017-09-28

## 2017-09-23 RX ADMIN — Medication 1 PATCH: at 09:14

## 2017-09-23 RX ADMIN — PANTOPRAZOLE SODIUM 40 MILLIGRAM(S): 20 TABLET, DELAYED RELEASE ORAL at 09:14

## 2017-09-23 RX ADMIN — Medication 1 MILLIGRAM(S): at 09:14

## 2017-09-23 RX ADMIN — Medication 1 MILLIGRAM(S): at 20:16

## 2017-09-23 RX ADMIN — LITHIUM CARBONATE 600 MILLIGRAM(S): 300 TABLET, EXTENDED RELEASE ORAL at 20:16

## 2017-09-24 LAB
ALBUMIN SERPL ELPH-MCNC: 4.3 G/DL — SIGNIFICANT CHANGE UP (ref 3.3–5)
ALP SERPL-CCNC: 48 U/L — SIGNIFICANT CHANGE UP (ref 40–120)
ALT FLD-CCNC: 663 U/L — HIGH (ref 4–33)
AST SERPL-CCNC: 57 U/L — HIGH (ref 4–32)
BILIRUB DIRECT SERPL-MCNC: 0.1 MG/DL — SIGNIFICANT CHANGE UP (ref 0.1–0.2)
BILIRUB SERPL-MCNC: 0.4 MG/DL — SIGNIFICANT CHANGE UP (ref 0.2–1.2)
PROT SERPL-MCNC: 7.6 G/DL — SIGNIFICANT CHANGE UP (ref 6–8.3)

## 2017-09-24 PROCEDURE — 99232 SBSQ HOSP IP/OBS MODERATE 35: CPT

## 2017-09-24 RX ADMIN — Medication 3 MILLIGRAM(S): at 22:41

## 2017-09-24 RX ADMIN — PANTOPRAZOLE SODIUM 40 MILLIGRAM(S): 20 TABLET, DELAYED RELEASE ORAL at 08:09

## 2017-09-24 RX ADMIN — LITHIUM CARBONATE 600 MILLIGRAM(S): 300 TABLET, EXTENDED RELEASE ORAL at 20:42

## 2017-09-24 RX ADMIN — Medication 1 PATCH: at 08:09

## 2017-09-24 RX ADMIN — Medication 2 MILLIGRAM(S): at 20:42

## 2017-09-24 RX ADMIN — Medication 1 MILLIGRAM(S): at 08:09

## 2017-09-25 LAB
ALBUMIN SERPL ELPH-MCNC: 4.4 G/DL — SIGNIFICANT CHANGE UP (ref 3.3–5)
ALP SERPL-CCNC: 50 U/L — SIGNIFICANT CHANGE UP (ref 40–120)
ALT FLD-CCNC: 508 U/L — HIGH (ref 4–33)
AST SERPL-CCNC: 34 U/L — HIGH (ref 4–32)
BILIRUB DIRECT SERPL-MCNC: 0.1 MG/DL — SIGNIFICANT CHANGE UP (ref 0.1–0.2)
BILIRUB SERPL-MCNC: 0.5 MG/DL — SIGNIFICANT CHANGE UP (ref 0.2–1.2)
PROT SERPL-MCNC: 7.8 G/DL — SIGNIFICANT CHANGE UP (ref 6–8.3)

## 2017-09-25 PROCEDURE — 99232 SBSQ HOSP IP/OBS MODERATE 35: CPT

## 2017-09-25 PROCEDURE — 99223 1ST HOSP IP/OBS HIGH 75: CPT

## 2017-09-25 RX ADMIN — Medication 2 MILLIGRAM(S): at 21:04

## 2017-09-25 RX ADMIN — PANTOPRAZOLE SODIUM 40 MILLIGRAM(S): 20 TABLET, DELAYED RELEASE ORAL at 08:51

## 2017-09-25 RX ADMIN — LITHIUM CARBONATE 600 MILLIGRAM(S): 300 TABLET, EXTENDED RELEASE ORAL at 21:04

## 2017-09-25 RX ADMIN — Medication 1 PATCH: at 08:51

## 2017-09-25 RX ADMIN — Medication 3 MILLIGRAM(S): at 22:45

## 2017-09-25 RX ADMIN — Medication 2 MILLIGRAM(S): at 08:51

## 2017-09-25 NOTE — CONSULT NOTE ADULT - SUBJECTIVE AND OBJECTIVE BOX
HPI:     21 yo F w/ borderline PD, bipolar d/o admitted to [facility] s/p Tylenol OD - approx 100 pills. Patient initially had nausea, vomiting, and RUQ pain when she first presented.     PAST MEDICAL & SURGICAL HISTORY:  Borderline personality disorder  Bipolar 1 disorder  Status post tonsillectomy and adenoidectomy      Review of Systems:   CONSTITUTIONAL: No fever, weight loss, or fatigue  EYES: No eye pain, visual disturbances, or discharge  ENMT:  No difficulty hearing, tinnitus, vertigo; No sinus or throat pain  NECK: No pain or stiffness  RESPIRATORY: No cough, wheezing, chills or hemoptysis; No shortness of breath  CARDIOVASCULAR: No chest pain, palpitations, dizziness, or leg swelling  GASTROINTESTINAL: No abdominal or epigastric pain. No nausea, vomiting, or hematemesis; No diarrhea or constipation. No melena or hematochezia.  GENITOURINARY: No dysuria, frequency, hematuria, or incontinence  NEUROLOGICAL: No headaches, memory loss, loss of strength, numbness, or tremors  SKIN: No itching, burning, rashes, or lesions   LYMPH NODES: No enlarged glands  ENDOCRINE: No heat or cold intolerance; No hair loss  MUSCULOSKELETAL: No joint pain or swelling; No muscle, back, or extremity pain  HEME/LYMPH: No easy bruising, or bleeding gums  ALLERY AND IMMUNOLOGIC: No hives or eczema    Allergies    No Known Allergies    Intolerances        Social History:     FAMILY HISTORY:  No pertinent family history      MEDICATIONS  (STANDING):  nicotine -   7 mG/24Hr(s) Patch 1 patch Transdermal daily  pantoprazole    Tablet 40 milliGRAM(s) Oral before breakfast  lithium 600 milliGRAM(s) Oral at bedtime  LORazepam     Tablet 2 milliGRAM(s) Oral two times a day    MEDICATIONS  (PRN):  aluminum hydroxide/magnesium hydroxide/simethicone Suspension 30 milliLiter(s) Oral every 6 hours PRN Dyspepsia  LORazepam   Injectable 2 milliGRAM(s) IntraMuscular once PRN Agitation  hydrOXYzine hydrochloride 50 milliGRAM(s) Oral every 6 hours PRN Anxiety  melatonin. 3 milliGRAM(s) Oral at bedtime PRN Insomnia      Vital Signs Last 24 Hrs  T(C): 36.6 (25 Sep 2017 08:08), Max: 36.6 (25 Sep 2017 08:08)  T(F): 97.8 (25 Sep 2017 08:08), Max: 97.8 (25 Sep 2017 08:08)  HR: --  BP: --  BP(mean): --  RR: 18 (25 Sep 2017 08:08) (18 - 18)  SpO2: --  CAPILLARY BLOOD GLUCOSE            PHYSICAL EXAM:  GENERAL: NAD, well-developed  HEAD:  Atraumatic, Normocephalic  EYES: EOMI, PERRLA, conjunctiva and sclera clear  NECK: Supple, No JVD  CHEST/LUNG: Clear to auscultation bilaterally; No wheeze  HEART: Regular rate and rhythm; No murmurs, rubs, or gallops  ABDOMEN: Soft, Nontender, Nondistended; Bowel sounds present  EXTREMITIES:  2+ Peripheral Pulses, No clubbing, cyanosis, or edema  PSYCH: AAOx3  NEUROLOGY: non-focal  SKIN: No rashes or lesions    LABS:        TPro  7.8  /  Alb  4.4  /  TBili  0.5  /  DBili  0.1  /  AST  34<H>  /  ALT  508<H>  /  AlkPhos  50  09-25              EKG(personally reviewed):    RADIOLOGY & ADDITIONAL TESTS:    Imaging Personally Reviewed:    Consultant(s) Notes Reviewed:      Care Discussed with Consultants/Other Providers: HPI:     21 yo F w/ borderline PD, bipolar d/o admitted to [facility] s/p Tylenol OD - approx 100 pills. Patient initially had nausea, vomiting, and RUQ pain when she first presented. She was seen by Toxicology and initiated on NAC therapy. Pt received 21 hours of standard NAC therapy and then an additional 32 hours due to increasing LFTs. Her LFTs peaked on 9/20 and have been downtrending since.   Pt currently denies all complaints. N/V/abdominal pain resolved.  No chest pain or SOB.   Tolerating PO.       PAST MEDICAL & SURGICAL HISTORY:  Borderline personality disorder  Bipolar 1 disorder  Status post tonsillectomy and adenoidectomy      Review of Systems:   limited due to pt cooperation, as per HPI     Allergies    No Known Allergies    Intolerances        Social History:   lives with mother  7 pack year smoker, smokes 1 ppd   no alcohol use  occasional marijuana use    FAMILY HISTORY:  No pertinent family history      MEDICATIONS  (STANDING):  nicotine -   7 mG/24Hr(s) Patch 1 patch Transdermal daily  pantoprazole    Tablet 40 milliGRAM(s) Oral before breakfast  lithium 600 milliGRAM(s) Oral at bedtime  LORazepam     Tablet 2 milliGRAM(s) Oral two times a day    MEDICATIONS  (PRN):  aluminum hydroxide/magnesium hydroxide/simethicone Suspension 30 milliLiter(s) Oral every 6 hours PRN Dyspepsia  LORazepam   Injectable 2 milliGRAM(s) IntraMuscular once PRN Agitation  hydrOXYzine hydrochloride 50 milliGRAM(s) Oral every 6 hours PRN Anxiety  melatonin. 3 milliGRAM(s) Oral at bedtime PRN Insomnia      Vital Signs Last 24 Hrs  T(C): 36.6 (25 Sep 2017 08:08), Max: 36.6 (25 Sep 2017 08:08)  T(F): 97.8 (25 Sep 2017 08:08), Max: 97.8 (25 Sep 2017 08:08)  HR: --80  BP: --114/67   BP(mean): --  RR: 18 (25 Sep 2017 08:08) (18 - 18)  SpO2: --  CAPILLARY BLOOD GLUCOSE            PHYSICAL EXAM:  GENERAL: NAD, well-developed  HEAD:  Atraumatic, Normocephalic  EYES: EOMI, PERRLA, conjunctiva and sclera clear  NECK: Supple, No JVD  CHEST/LUNG: Clear to auscultation bilaterally; No wheeze  HEART: Regular rate and rhythm; No murmurs, rubs, or gallops  ABDOMEN: Soft, Nontender, Nondistended; Bowel sounds present  EXTREMITIES:  2+ Peripheral Pulses, No clubbing, cyanosis, or edema  PSYCH: AAOx3  NEUROLOGY: non-focal  SKIN: No rashes or lesions    LABS:        TPro  7.8  /  Alb  4.4  /  TBili  0.5  /  DBili  0.1  /  AST  34<H>  /  ALT  508<H>  /  AlkPhos  50  09-25              EKG(personally reviewed):    RADIOLOGY & ADDITIONAL TESTS:    Imaging Personally Reviewed:    Consultant(s) Notes Reviewed:      Care Discussed with Consultants/Other Providers:

## 2017-09-25 NOTE — CONSULT NOTE ADULT - ASSESSMENT
21 yo F w/ borderline PD and bipolar d/o s/p [facility] admission for Tylenol OD, requiring NAC, with resolving transaminitis    1) 23 yo F w/ borderline PD and bipolar d/o s/p [facility] admission for Tylenol OD, requiring NAC, with resolving transaminitis    1) Transaminitis 2/2 Tylenol OD - LFTs continue to downtrend, would monitor qweekly while at Health system, otherwise asymptomatic   2) Bipolar d/o, borderline PD - management as per Psych     D/w NORAH Kasper

## 2017-09-26 PROCEDURE — 90832 PSYTX W PT 30 MINUTES: CPT | Mod: 59

## 2017-09-26 PROCEDURE — 99232 SBSQ HOSP IP/OBS MODERATE 35: CPT | Mod: 25

## 2017-09-26 PROCEDURE — 90853 GROUP PSYCHOTHERAPY: CPT

## 2017-09-26 RX ADMIN — Medication 2 MILLIGRAM(S): at 11:45

## 2017-09-26 RX ADMIN — Medication 50 MILLIGRAM(S): at 11:40

## 2017-09-26 RX ADMIN — Medication 1 PATCH: at 10:05

## 2017-09-26 RX ADMIN — Medication 2 MILLIGRAM(S): at 10:05

## 2017-09-26 RX ADMIN — LITHIUM CARBONATE 600 MILLIGRAM(S): 300 TABLET, EXTENDED RELEASE ORAL at 20:43

## 2017-09-26 RX ADMIN — Medication 2 MILLIGRAM(S): at 20:43

## 2017-09-26 RX ADMIN — PANTOPRAZOLE SODIUM 40 MILLIGRAM(S): 20 TABLET, DELAYED RELEASE ORAL at 10:05

## 2017-09-27 PROCEDURE — 99232 SBSQ HOSP IP/OBS MODERATE 35: CPT

## 2017-09-27 RX ORDER — GABAPENTIN 400 MG/1
100 CAPSULE ORAL
Qty: 0 | Refills: 0 | Status: DISCONTINUED | OUTPATIENT
Start: 2017-09-27 | End: 2017-10-02

## 2017-09-27 RX ADMIN — Medication 2 MILLIGRAM(S): at 08:26

## 2017-09-27 RX ADMIN — Medication 3 MILLIGRAM(S): at 22:57

## 2017-09-27 RX ADMIN — Medication 1.5 MILLIGRAM(S): at 21:10

## 2017-09-27 RX ADMIN — Medication 1 PATCH: at 08:26

## 2017-09-27 RX ADMIN — PANTOPRAZOLE SODIUM 40 MILLIGRAM(S): 20 TABLET, DELAYED RELEASE ORAL at 08:26

## 2017-09-27 RX ADMIN — LITHIUM CARBONATE 600 MILLIGRAM(S): 300 TABLET, EXTENDED RELEASE ORAL at 21:10

## 2017-09-27 RX ADMIN — GABAPENTIN 100 MILLIGRAM(S): 400 CAPSULE ORAL at 21:10

## 2017-09-28 LAB
ALBUMIN SERPL ELPH-MCNC: 4.3 G/DL — SIGNIFICANT CHANGE UP (ref 3.3–5)
ALP SERPL-CCNC: 46 U/L — SIGNIFICANT CHANGE UP (ref 40–120)
ALT FLD-CCNC: 198 U/L — HIGH (ref 4–33)
AST SERPL-CCNC: 19 U/L — SIGNIFICANT CHANGE UP (ref 4–32)
BILIRUB SERPL-MCNC: 0.7 MG/DL — SIGNIFICANT CHANGE UP (ref 0.2–1.2)
BUN SERPL-MCNC: 7 MG/DL — SIGNIFICANT CHANGE UP (ref 7–23)
CALCIUM SERPL-MCNC: 9.1 MG/DL — SIGNIFICANT CHANGE UP (ref 8.4–10.5)
CHLORIDE SERPL-SCNC: 102 MMOL/L — SIGNIFICANT CHANGE UP (ref 98–107)
CO2 SERPL-SCNC: 26 MMOL/L — SIGNIFICANT CHANGE UP (ref 22–31)
CREAT SERPL-MCNC: 0.77 MG/DL — SIGNIFICANT CHANGE UP (ref 0.5–1.3)
GLUCOSE SERPL-MCNC: 77 MG/DL — SIGNIFICANT CHANGE UP (ref 70–99)
LITHIUM SERPL-MCNC: 0.64 MMOL/L — SIGNIFICANT CHANGE UP (ref 0.6–1.2)
POTASSIUM SERPL-MCNC: 4.2 MMOL/L — SIGNIFICANT CHANGE UP (ref 3.5–5.3)
POTASSIUM SERPL-SCNC: 4.2 MMOL/L — SIGNIFICANT CHANGE UP (ref 3.5–5.3)
PROT SERPL-MCNC: 7.4 G/DL — SIGNIFICANT CHANGE UP (ref 6–8.3)
SODIUM SERPL-SCNC: 141 MMOL/L — SIGNIFICANT CHANGE UP (ref 135–145)
TSH SERPL-MCNC: 3.36 UIU/ML — SIGNIFICANT CHANGE UP (ref 0.27–4.2)

## 2017-09-28 PROCEDURE — 99232 SBSQ HOSP IP/OBS MODERATE 35: CPT

## 2017-09-28 RX ORDER — LITHIUM CARBONATE 300 MG/1
900 TABLET, EXTENDED RELEASE ORAL AT BEDTIME
Qty: 0 | Refills: 0 | Status: DISCONTINUED | OUTPATIENT
Start: 2017-09-28 | End: 2017-10-09

## 2017-09-28 RX ORDER — NICOTINE POLACRILEX 2 MG
1 GUM BUCCAL EVERY 4 HOURS
Qty: 0 | Refills: 0 | Status: DISCONTINUED | OUTPATIENT
Start: 2017-09-28 | End: 2017-10-09

## 2017-09-28 RX ADMIN — Medication 1.5 MILLIGRAM(S): at 21:34

## 2017-09-28 RX ADMIN — LITHIUM CARBONATE 900 MILLIGRAM(S): 300 TABLET, EXTENDED RELEASE ORAL at 21:34

## 2017-09-28 RX ADMIN — GABAPENTIN 100 MILLIGRAM(S): 400 CAPSULE ORAL at 21:34

## 2017-09-28 RX ADMIN — Medication 2 MILLIGRAM(S): at 08:55

## 2017-09-28 RX ADMIN — Medication 3 MILLIGRAM(S): at 22:30

## 2017-09-28 RX ADMIN — Medication 1 MILLIGRAM(S): at 00:17

## 2017-09-28 RX ADMIN — PANTOPRAZOLE SODIUM 40 MILLIGRAM(S): 20 TABLET, DELAYED RELEASE ORAL at 08:56

## 2017-09-28 RX ADMIN — Medication 50 MILLIGRAM(S): at 22:30

## 2017-09-28 RX ADMIN — GABAPENTIN 100 MILLIGRAM(S): 400 CAPSULE ORAL at 08:55

## 2017-09-28 RX ADMIN — Medication 1 EACH: at 17:20

## 2017-09-28 RX ADMIN — Medication 1 PATCH: at 09:02

## 2017-09-28 RX ADMIN — Medication 1 PATCH: at 08:55

## 2017-09-28 NOTE — CHART NOTE - NSCHARTNOTEFT_GEN_A_CORE
22 F with a PMH of seizure disorder was evaluated during a rapid response call for a seizure at 12:20AM. As per the nurses, the patient had been very weak and tired for about 45min-1hr prior to the seizure. She also c/o auras and a metal taste in her mouth. She received 1mg ativan as ppx however it did not fully prevent the seizure. The patient was sitting down in the day room with her head on a pillow, unresponsive, drooling, and had intermittent shoulder jerks. It lasted only a few minutes before she became alert. She admitted to biting her tongue, mild nausea, and a headache. She did not recall the incident, and was very "tired". She stated that she has not had a seizure in 1 year so she was taken off of Lamictal by her neurologist 4 months ago.     CAPILLARY BLOOD GLUCOSE  99 (28 Sep 2017 00:50)        I&O's Summary      PHYSICAL EXAM:  GENERAL: Lethargic  HEENT:  Atraumatic, Normocephalic; EOMI, PERRLA, conjunctiva and sclera clear; Minor erythematic lesion on L lateral tongue, no bleeding.   CHEST/LUNG: Clear to auscultation bilaterally; No wheeze  HEART: Regular rate and rhythm  EXTREMITIES:  2+ Peripheral Pulses  PSYCH: AAOx3  NEUROLOGY: non-focal  SKIN: No rashes or lesions    Vital Signs Last 24 Hrs  T(C): 36.6 (28 Sep 2017 06:54), Max: 36.8 (27 Sep 2017 08:28)  T(F): 97.9 (28 Sep 2017 06:54), Max: 98.3 (27 Sep 2017 08:28)  HR: 68 (28 Sep 2017 00:50) (68 - 77)  BP: 98/65 (28 Sep 2017 00:50) (86/49 - 103/60)  BP(mean): --  RR: 18 (28 Sep 2017 06:54) (18 - 20)  SpO2: 99% (28 Sep 2017 00:50) (99% - 99%)      Assessment and Plan: Patient was put in Lillian chair in day room so she could be monitored. She denied any medication for the headache. The primary team will call for neuro consult to evaluate for initiation of seizure treatment.

## 2017-09-29 PROCEDURE — 99232 SBSQ HOSP IP/OBS MODERATE 35: CPT

## 2017-09-29 PROCEDURE — 90832 PSYTX W PT 30 MINUTES: CPT

## 2017-09-29 RX ORDER — HALOPERIDOL DECANOATE 100 MG/ML
5 INJECTION INTRAMUSCULAR ONCE
Qty: 0 | Refills: 0 | Status: DISCONTINUED | OUTPATIENT
Start: 2017-09-29 | End: 2017-09-29

## 2017-09-29 RX ORDER — DIPHENHYDRAMINE HCL 50 MG
50 CAPSULE ORAL ONCE
Qty: 0 | Refills: 0 | Status: COMPLETED | OUTPATIENT
Start: 2017-09-29 | End: 2017-09-30

## 2017-09-29 RX ADMIN — Medication 50 MILLIGRAM(S): at 20:54

## 2017-09-29 RX ADMIN — Medication 50 MILLIGRAM(S): at 14:50

## 2017-09-29 RX ADMIN — PANTOPRAZOLE SODIUM 40 MILLIGRAM(S): 20 TABLET, DELAYED RELEASE ORAL at 07:42

## 2017-09-29 RX ADMIN — Medication 2 MILLIGRAM(S): at 07:42

## 2017-09-29 RX ADMIN — GABAPENTIN 100 MILLIGRAM(S): 400 CAPSULE ORAL at 07:42

## 2017-09-29 RX ADMIN — LITHIUM CARBONATE 900 MILLIGRAM(S): 300 TABLET, EXTENDED RELEASE ORAL at 21:17

## 2017-09-29 RX ADMIN — Medication 1.5 MILLIGRAM(S): at 21:17

## 2017-09-29 RX ADMIN — Medication 1 PATCH: at 07:42

## 2017-09-29 RX ADMIN — Medication 3 MILLIGRAM(S): at 21:54

## 2017-09-29 RX ADMIN — GABAPENTIN 100 MILLIGRAM(S): 400 CAPSULE ORAL at 21:17

## 2017-09-30 ENCOUNTER — EMERGENCY (EMERGENCY)
Facility: HOSPITAL | Age: 22
LOS: 1 days | Discharge: HOME | End: 2017-09-30
Attending: EMERGENCY MEDICINE | Admitting: EMERGENCY MEDICINE
Payer: COMMERCIAL

## 2017-09-30 VITALS
DIASTOLIC BLOOD PRESSURE: 71 MMHG | OXYGEN SATURATION: 100 % | TEMPERATURE: 98 F | SYSTOLIC BLOOD PRESSURE: 116 MMHG | RESPIRATION RATE: 18 BRPM | HEART RATE: 85 BPM

## 2017-09-30 VITALS
DIASTOLIC BLOOD PRESSURE: 78 MMHG | TEMPERATURE: 98 F | SYSTOLIC BLOOD PRESSURE: 131 MMHG | HEART RATE: 70 BPM | OXYGEN SATURATION: 100 % | RESPIRATION RATE: 15 BRPM

## 2017-09-30 DIAGNOSIS — Z90.89 ACQUIRED ABSENCE OF OTHER ORGANS: Chronic | ICD-10-CM

## 2017-09-30 LAB
BUN SERPL-MCNC: 8 MG/DL — SIGNIFICANT CHANGE UP (ref 7–23)
CALCIUM SERPL-MCNC: 9 MG/DL — SIGNIFICANT CHANGE UP (ref 8.4–10.5)
CHLORIDE SERPL-SCNC: 104 MMOL/L — SIGNIFICANT CHANGE UP (ref 98–107)
CO2 SERPL-SCNC: 23 MMOL/L — SIGNIFICANT CHANGE UP (ref 22–31)
CREAT SERPL-MCNC: 0.67 MG/DL — SIGNIFICANT CHANGE UP (ref 0.5–1.3)
GLUCOSE SERPL-MCNC: 87 MG/DL — SIGNIFICANT CHANGE UP (ref 70–99)
POTASSIUM SERPL-MCNC: 4.1 MMOL/L — SIGNIFICANT CHANGE UP (ref 3.5–5.3)
POTASSIUM SERPL-SCNC: 4.1 MMOL/L — SIGNIFICANT CHANGE UP (ref 3.5–5.3)
SODIUM SERPL-SCNC: 141 MMOL/L — SIGNIFICANT CHANGE UP (ref 135–145)

## 2017-09-30 PROCEDURE — 99285 EMERGENCY DEPT VISIT HI MDM: CPT

## 2017-09-30 RX ORDER — LAMOTRIGINE 25 MG/1
25 TABLET, ORALLY DISINTEGRATING ORAL
Qty: 0 | Refills: 0 | Status: DISCONTINUED | OUTPATIENT
Start: 2017-10-01 | End: 2017-10-09

## 2017-09-30 RX ORDER — DIPHENHYDRAMINE HCL 50 MG
50 CAPSULE ORAL ONCE
Qty: 0 | Refills: 0 | Status: COMPLETED | OUTPATIENT
Start: 2017-09-30 | End: 2017-10-08

## 2017-09-30 RX ORDER — LAMOTRIGINE 25 MG/1
25 TABLET, ORALLY DISINTEGRATING ORAL ONCE
Qty: 0 | Refills: 0 | Status: COMPLETED | OUTPATIENT
Start: 2017-09-30 | End: 2017-09-30

## 2017-09-30 RX ADMIN — LITHIUM CARBONATE 900 MILLIGRAM(S): 300 TABLET, EXTENDED RELEASE ORAL at 22:22

## 2017-09-30 RX ADMIN — Medication 2 MILLIGRAM(S): at 13:43

## 2017-09-30 RX ADMIN — PANTOPRAZOLE SODIUM 40 MILLIGRAM(S): 20 TABLET, DELAYED RELEASE ORAL at 08:55

## 2017-09-30 RX ADMIN — Medication 1 MILLIGRAM(S): at 00:18

## 2017-09-30 RX ADMIN — GABAPENTIN 100 MILLIGRAM(S): 400 CAPSULE ORAL at 08:55

## 2017-09-30 RX ADMIN — Medication 50 MILLIGRAM(S): at 00:18

## 2017-09-30 RX ADMIN — LAMOTRIGINE 25 MILLIGRAM(S): 25 TABLET, ORALLY DISINTEGRATING ORAL at 21:19

## 2017-09-30 RX ADMIN — Medication 1.5 MILLIGRAM(S): at 08:55

## 2017-09-30 RX ADMIN — Medication 1.5 MILLIGRAM(S): at 22:23

## 2017-09-30 RX ADMIN — GABAPENTIN 100 MILLIGRAM(S): 400 CAPSULE ORAL at 22:22

## 2017-09-30 NOTE — ED PROVIDER NOTE - CARE PLAN
Principal Discharge DX:	Seizure  Instructions for follow-up, activity and diet:	1) Please return to the ED should you have any new or worsening symptoms, worsening pain, develop seizure, headache, vision changes  2) Please take Lamictal 25 mg twice a day

## 2017-09-30 NOTE — ED PROVIDER NOTE - MEDICAL DECISION MAKING DETAILS
Malu Lima, DO: 22F with hx of seizures no longer on meds - requires med mgmt of seizures via neurology at [facility]. Ensure no electrolyte imbalances. No evidence of trauma on exam. If cleared by neuro, pt stable for return to [facility].

## 2017-09-30 NOTE — ED ADULT NURSE REASSESSMENT NOTE - NS ED NURSE REASSESS COMMENT FT1
pt. in NAD, denies any pain, for discharge. MHW with pt upon discharge to [facility].

## 2017-09-30 NOTE — ED ADULT NURSE NOTE - OBJECTIVE STATEMENT
Pt received in #24, aaox3, staff member from [facility] present for CO, with c/o seizure. States that she had a seizure last night and today. Bit tongue but no active bleeding. Denies head injury or any other injury. States last seizure was 8 months ago, placed on lamictal which was subsequently d/c'ed due to being seizure free for 8 months. Denies any loss of sleep, loss of appetitive, any recent illness, fever or chills.

## 2017-09-30 NOTE — ED PROVIDER NOTE - OBJECTIVE STATEMENT
Malu Lima, DO: 22F here from [facility] for seizure activity d/c'd by ativan.  Pt with hx of seizures in past on Lamictal, d/c'd 2 months ago 2/2 stable neuro status. Pt in [facility] for Tylenol OD. Denies SI/HI/AH/VH at this time. No fevers/chills, nausea/vomiting, CP/SOB. Malu Lima, DO: 22F Bipolar d/o on Lithium and seizure d/o previously on Lamictal here from [facility] for seizure activity d/c'd by ativan.  Lamictal d/c'd 2 months ago 2/2 stable neuro status. Pt in [facility] for Tylenol OD. Denies SI/HI/AH/VH at this time. No fevers/chills, nausea/vomiting, CP/SOB. No recent trauma.

## 2017-09-30 NOTE — ED PROVIDER NOTE - PHYSICAL EXAMINATION
Malu Lima, DO:   Gen: Well appearing, NAD  Head: NCAT  HEENT: PERRL, MMM, normal conjunctiva, anicteric, neck supple  Lung: CTAB, no adventitious sounds  CV: RRR, no murmurs, rubs or gallops  Abd: soft, NTND, no rebound or guarding, no CVAT  MSK: No edema, no visible deformities, no midline TTP  Neuro: No focal neurologic deficits. CN II-XII intact. 5/5 strength and normal sensation in all extremities. No dysmetria, no diadochokinesia, gait stable & tandem gait intact. No pronator drift.   Skin: Warm and dry, no evidence of rash  Psych: normal mood and affect

## 2017-09-30 NOTE — ED PROVIDER NOTE - NS ED ROS FT
Malu Lima, DO: ROS: denies HA, weakness, dizziness, fevers/chills, nausea/vomiting, chest pain, SOB, diaphoresis, abdominal pain, diarrhea, joint pain, neuro deficits, dysuria/hematuria, rash Malu Lima DO: ROS: denies HA, weakness, dizziness, fevers/chills, nausea/vomiting, chest pain, SOB, diaphoresis, abdominal pain, diarrhea, joint pain, neuro deficits, dysuria/hematuria, rash.

## 2017-09-30 NOTE — ED PROVIDER NOTE - PLAN OF CARE
1) Please return to the ED should you have any new or worsening symptoms, worsening pain, develop seizure, headache, vision changes  2) Please take Lamictal 25 mg twice a day

## 2017-09-30 NOTE — CONSULT NOTE ADULT - ASSESSMENT
22 year old Female brought to ED from [facility] for seizure activity, previously on Lamictal, but ceased taking it 2 months ago as she has had clinical improvement.    Plan:  - no focal neurological deficits noted on exam  - routine EEG  - restart home dose of Lamictal  - psychiatry consult 22 year old Female brought to ED from [facility] for seizure activity, previously on Lamictal, but ceased taking it 2 months ago as she has had clinical improvement.    Plan:  - no focal neurological deficits noted on exam  - routine EEG  - restart home dose of Lamictal  - psychiatry consult  - seizure/fall precautions 22 year old Female brought to ED from [facility] for seizure activity, previously on Lamictal, but ceased taking it 2 months ago as she has had clinical improvement.    Plan:  - no focal neurological deficits noted on exam  - routine EEG  - restart Lamictal 25mg BID  - psychiatry consult  - seizure/fall precautions 22 year old Female brought to ED from [facility] for seizure activity, previously on Lamictal, but ceased taking it 2 months ago as she has had clinical improvement.    Plan:  - no focal neurological deficits noted on exam  - routine EEG may be done as outpatient  - restart Lamictal 25mg BID  - psychiatry consult  - seizure/fall precautions

## 2017-09-30 NOTE — ED PROVIDER NOTE - ATTENDING CONTRIBUTION TO CARE
22F here from [facility] for seizure activity. Patient endorses history of seizures in the past, used to be on lamictal, but has been off medication for past couple of months. She states that she needs to be back on lamictal. Witnessed seizure, no falls or trauma.    Patient denies headache, vision changes, focal weakness/numbness, neck pain, fever, cough, chest pain, shortness of breath, back pain, abd pain, nausea, vomiting, diarrhea, constipation, blood in stool, dysuria, rash, trauma, falls. Patient is well appearing, conversant, cooperative, smiling, head atraumatic, neck supple with full range of motion, oropharynx clear, lungs clear, no crackles or rales, speaking full sentences, heart clear, no murmurs, distal pulses equal in all 4 extremities, abdomen soft nontender nondistended with no masses, no leg edema, no calf tenderness, nonfocal neurologic exam. No acute signs of trauma, infection, sepsis, toxidrome, intoxication. Check electrolytes, start lamictal, likely return to [facility].

## 2017-09-30 NOTE — CONSULT NOTE ADULT - SUBJECTIVE AND OBJECTIVE BOX
Neurology Consult    Name  COLEMAN SPENCER    Patient is a 22 year old Female brought to ED from [facility] for seizure activity. Patient endorses history of seizures in the past, and used to be on lamictal, but has been off medication for the past couple of months due to clinical improvement. Patient reports that she needs to be back on lamictal. Seizure was witnessed in [facility], no head trauma noted.  Neurology consulted for seizure                                                          MEDICATIONS  (STANDING):    MEDICATIONS  (PRN):      Allergies    No Known Allergies    Intolerances        Objective  Vital Signs Last 24 Hrs  T(C): 36.7 (30 Sep 2017 18:20), Max: 36.8 (30 Sep 2017 15:34)  T(F): 98.1 (30 Sep 2017 18:20), Max: 98.3 (30 Sep 2017 15:34)  HR: 75 (30 Sep 2017 18:20) (75 - 85)  BP: 123/77 (30 Sep 2017 18:20) (116/71 - 123/77)  BP(mean): --  RR: 14 (30 Sep 2017 18:20) (14 - 20)  SpO2: 100% (30 Sep 2017 18:20) (100% - 100%)    General Exam   General appearance: No acute distress, well-nourished  Respiratory:    non-labored respirations               Neurological Exam  Mental Status:  alert and oriented x3, fluent speech, following commands, repetition and naming intact    Cranial Nerves: PERRL, EOMI without nystagmus, visual fields intact no facial droop, no dysarthria    Motor:   Tone:   normal               Strength:  Upper extremity                          Delt       Bicep    Tricep                                                  R         5/5        5/5        5/5       5/5                                               L          5/5        5/5        5/5      5/5    Lower extremity                           HF          KE          KF        DF         PF                                               R        5/5        5/5        5/5       5/5       5/5                                               L         5/5        5/5        5/5      5/5        5/5    Pronator drift:   none           Dysmetria: none with finger-to-nose testing  Tremor:  none appreciated at rest or in action    Sensation: intact grossly to light touch    Deep Tendon Reflexes:  2+ throughout  Toes flexor bilaterally       Other Studies    09-30    141  |  104  |  8   ----------------------------<  87  4.1   |  23  |  0.67    Ca    9.0      30 Sep 2017 16:40      09-30    141  |  104  |  8   ----------------------------<  87  4.1   |  23  |  0.67    Ca    9.0      30 Sep 2017 16:40          Radiology    CTh/CTA:  MRI/MRA:  TTE:  EEG:

## 2017-09-30 NOTE — ED ADULT NURSE NOTE - CHIEF COMPLAINT QUOTE
Pt from [facility] arrives with aide. Pt brought over for seizure activity was given 2mg ativan prior to coming to ER. Pt arrives awake and alert x 4. Pt states last seizure 8 months ago ,pt on no  medication at this time for seizures.

## 2017-09-30 NOTE — ED ADULT TRIAGE NOTE - CHIEF COMPLAINT QUOTE
Pt from [facility] arrives with aide. Pt brought over for seizure activity was given 2mg ativan prior to coming to ER. Pt arrives awake and alert x 4. Pt states last seizure 8 months ago ,pt on no  medication at this time for seizures.

## 2017-09-30 NOTE — CHART NOTE - NSCHARTNOTEFT_GEN_A_CORE
[facility] Inpatient to ED Transfer Summary    Reason for Transfer/Medical Summary:  21yo F with bipolar disorder, BPD, history of seizures, p/w tylenol overdose (admitted on 9/22/17), now with seizures (3 min and 2 min consecutively at 13:32). Seizure was witnessed by staff as generalized shaking, pt unresponsive, ys closed. Pt received Ativan 2mg IM. Vitals were stable throughout, O sat % on oxygen, /134, HR 75. FS 85. On PE, lungs are CTA, of note pt's tongue deviates to the left. No other focal neurological deficit. After 5 minutes patient was responsive, followd command, states that she bit her tongue.     PAST MEDICAL & SURGICAL HISTORY:  Borderline personality disorder  Bipolar 1 disorder  Status post tonsillectomy and adenoidectomy      Allergies    No Known Allergies    Intolerances        MEDICATIONS  (STANDING):  nicotine -   7 mG/24Hr(s) Patch 1 patch Transdermal daily  pantoprazole    Tablet 40 milliGRAM(s) Oral before breakfast  LORazepam     Tablet 1.5 milliGRAM(s) Oral at bedtime  gabapentin 100 milliGRAM(s) Oral two times a day  lithium 900 milliGRAM(s) Oral at bedtime  LORazepam     Tablet 1.5 milliGRAM(s) Oral daily    MEDICATIONS  (PRN):  aluminum hydroxide/magnesium hydroxide/simethicone Suspension 30 milliLiter(s) Oral every 6 hours PRN Dyspepsia  hydrOXYzine hydrochloride 50 milliGRAM(s) Oral every 6 hours PRN Anxiety  melatonin. 3 milliGRAM(s) Oral at bedtime PRN Insomnia  nicotine - Inhaler 1 Each Inhalation every 4 hours PRN nicotine cravings  LORazepam   Injectable 2 milliGRAM(s) IntraMuscular once PRN Agitation  LORazepam   Injectable 2 milliGRAM(s) IntraMuscular once PRN seizure        CAPILLARY BLOOD GLUCOSE            PHYSICAL EXAM:  GENERAL: NAD, well-developed  HEAD:  Atraumatic, Normocephalic  EYES: EOMI, PERRLA, conjunctiva and sclera clear  NECK: Supple, No JVD  CHEST/LUNG: Clear to auscultation bilaterally; No wheeze  HEART: Regular rate and rhythm; No murmurs, rubs, or gallops  ABDOMEN: Soft, Nontender, Nondistended; Bowel sounds present  EXTREMITIES:  2+ Peripheral Pulses, No clubbing, cyanosis, or edema  PSYCH: AAOx3  NEUROLOGY: non-focal  SKIN: No rashes or lesions    LABS:                    Psychiatry Section:  Psychiatric Summary/[facility] admitting diagnosis:  21yo F with bipolar disorder, BPD, history of seizures, p/w tylenol overdose (admitted on 9/22/17), now with  witnessed seizures      Psychiatric Recommendations:  PRNS Atarax 25mg PO q6 for anxiety, Ativan mg IV/IM for agitation  Continue standing medications:   LORazepam     Tablet 1.5 milliGRAM(s) Oral at bedtime  gabapentin 100 milliGRAM(s) Oral two times a day  lithium 900 milliGRAM(s) Oral at bedtime  LORazepam     Tablet 1.5 milliGRAM(s) Oral daily      Observation status (check one):   ( x) Constant Observation  ( ) Enhanced care  ( ) Routine checks    Risk Status (check all that apply if present):  (x ) at risk for suicide/self-injury  ( ) at risk for aggressive behavior  ( ) at risk for elopement  ( ) other risk:  Inpatient to ED Transfer Summary    Reason for Transfer/Medical Summary:  23yo F with bipolar disorder, BPD, history of seizures, p/w tylenol overdose (admitted on 9/22/17), now with seizures (3 min and 2 min consecutively at 13:32). Seizure was witnessed by staff as generalized shaking, pt unresponsive, ys closed. Pt received Ativan 2mg IM. Vitals were stable throughout, O sat % on oxygen, /134, HR 75. FS 85. On PE, lungs are CTA, of note pt's tongue deviates to the left. No other focal neurological deficit. After 5 minutes patient was responsive, followd command, states that she bit her tongue.     Mother Brianna Anthony notified at     PAST MEDICAL & SURGICAL HISTORY:  Borderline personality disorder  Bipolar 1 disorder  Status post tonsillectomy and adenoidectomy      Allergies    No Known Allergies    Intolerances        MEDICATIONS  (STANDING):  nicotine -   7 mG/24Hr(s) Patch 1 patch Transdermal daily  pantoprazole    Tablet 40 milliGRAM(s) Oral before breakfast  LORazepam     Tablet 1.5 milliGRAM(s) Oral at bedtime  gabapentin 100 milliGRAM(s) Oral two times a day  lithium 900 milliGRAM(s) Oral at bedtime  LORazepam     Tablet 1.5 milliGRAM(s) Oral daily    MEDICATIONS  (PRN):  aluminum hydroxide/magnesium hydroxide/simethicone Suspension 30 milliLiter(s) Oral every 6 hours PRN Dyspepsia  hydrOXYzine hydrochloride 50 milliGRAM(s) Oral every 6 hours PRN Anxiety  melatonin. 3 milliGRAM(s) Oral at bedtime PRN Insomnia  nicotine - Inhaler 1 Each Inhalation every 4 hours PRN nicotine cravings  LORazepam   Injectable 2 milliGRAM(s) IntraMuscular once PRN Agitation  LORazepam   Injectable 2 milliGRAM(s) IntraMuscular once PRN seizure        CAPILLARY BLOOD GLUCOSE            PHYSICAL EXAM:  GENERAL: NAD, well-developed  HEAD:  Atraumatic, Normocephalic  EYES: EOMI, PERRLA, conjunctiva and sclera clear  NECK: Supple, No JVD  CHEST/LUNG: Clear to auscultation bilaterally; No wheeze  HEART: Regular rate and rhythm; No murmurs, rubs, or gallops  ABDOMEN: Soft, Nontender, Nondistended; Bowel sounds present  EXTREMITIES:  2+ Peripheral Pulses, No clubbing, cyanosis, or edema  PSYCH: AAOx3  NEUROLOGY: non-focal  SKIN: No rashes or lesions    LABS:                    Psychiatry Section:  Psychiatric Summary/[facility] admitting diagnosis:  23yo F with bipolar disorder, BPD, history of seizures, p/w tylenol overdose (admitted on 9/22/17), now with  witnessed seizures      Psychiatric Recommendations:  PRNS Atarax 25mg PO q6 for anxiety, Ativan mg IV/IM for agitation  Continue standing medications:   LORazepam     Tablet 1.5 milliGRAM(s) Oral at bedtime  gabapentin 100 milliGRAM(s) Oral two times a day  lithium 900 milliGRAM(s) Oral at bedtime  LORazepam     Tablet 1.5 milliGRAM(s) Oral daily      Observation status (check one):   ( x) Constant Observation  ( ) Enhanced care  ( ) Routine checks    Risk Status (check all that apply if present):  (x ) at risk for suicide/self-injury  ( ) at risk for aggressive behavior  ( ) at risk for elopement  ( ) other risk:

## 2017-10-01 RX ORDER — HYDROXYZINE HCL 10 MG
50 TABLET ORAL ONCE
Qty: 0 | Refills: 0 | Status: COMPLETED | OUTPATIENT
Start: 2017-10-01 | End: 2017-10-01

## 2017-10-01 RX ORDER — DIPHENHYDRAMINE HCL 50 MG
50 CAPSULE ORAL AT BEDTIME
Qty: 0 | Refills: 0 | Status: DISCONTINUED | OUTPATIENT
Start: 2017-10-01 | End: 2017-10-09

## 2017-10-01 RX ADMIN — PANTOPRAZOLE SODIUM 40 MILLIGRAM(S): 20 TABLET, DELAYED RELEASE ORAL at 09:07

## 2017-10-01 RX ADMIN — Medication 1.5 MILLIGRAM(S): at 22:06

## 2017-10-01 RX ADMIN — Medication 3 MILLIGRAM(S): at 20:03

## 2017-10-01 RX ADMIN — Medication 0.5 MILLIGRAM(S): at 11:12

## 2017-10-01 RX ADMIN — Medication 1.5 MILLIGRAM(S): at 09:07

## 2017-10-01 RX ADMIN — Medication 1 EACH: at 09:59

## 2017-10-01 RX ADMIN — GABAPENTIN 100 MILLIGRAM(S): 400 CAPSULE ORAL at 09:07

## 2017-10-01 RX ADMIN — Medication 50 MILLIGRAM(S): at 17:41

## 2017-10-01 RX ADMIN — Medication 0.5 MILLIGRAM(S): at 09:59

## 2017-10-01 RX ADMIN — Medication 50 MILLIGRAM(S): at 23:41

## 2017-10-01 RX ADMIN — Medication 50 MILLIGRAM(S): at 09:59

## 2017-10-01 RX ADMIN — LAMOTRIGINE 25 MILLIGRAM(S): 25 TABLET, ORALLY DISINTEGRATING ORAL at 09:07

## 2017-10-01 RX ADMIN — LAMOTRIGINE 25 MILLIGRAM(S): 25 TABLET, ORALLY DISINTEGRATING ORAL at 22:06

## 2017-10-01 RX ADMIN — Medication 50 MILLIGRAM(S): at 22:46

## 2017-10-01 RX ADMIN — LITHIUM CARBONATE 900 MILLIGRAM(S): 300 TABLET, EXTENDED RELEASE ORAL at 22:06

## 2017-10-01 RX ADMIN — Medication 1 EACH: at 22:46

## 2017-10-01 RX ADMIN — GABAPENTIN 100 MILLIGRAM(S): 400 CAPSULE ORAL at 22:06

## 2017-10-02 PROCEDURE — 99222 1ST HOSP IP/OBS MODERATE 55: CPT | Mod: GC

## 2017-10-02 PROCEDURE — 99232 SBSQ HOSP IP/OBS MODERATE 35: CPT

## 2017-10-02 RX ORDER — DIPHENHYDRAMINE HCL 50 MG
50 CAPSULE ORAL EVERY 6 HOURS
Qty: 0 | Refills: 0 | Status: DISCONTINUED | OUTPATIENT
Start: 2017-10-02 | End: 2017-10-09

## 2017-10-02 RX ORDER — HALOPERIDOL DECANOATE 100 MG/ML
5 INJECTION INTRAMUSCULAR EVERY 6 HOURS
Qty: 0 | Refills: 0 | Status: DISCONTINUED | OUTPATIENT
Start: 2017-10-02 | End: 2017-10-09

## 2017-10-02 RX ORDER — GABAPENTIN 400 MG/1
100 CAPSULE ORAL THREE TIMES A DAY
Qty: 0 | Refills: 0 | Status: DISCONTINUED | OUTPATIENT
Start: 2017-10-02 | End: 2017-10-09

## 2017-10-02 RX ORDER — QUETIAPINE FUMARATE 200 MG/1
50 TABLET, FILM COATED ORAL AT BEDTIME
Qty: 0 | Refills: 0 | Status: DISCONTINUED | OUTPATIENT
Start: 2017-10-02 | End: 2017-10-09

## 2017-10-02 RX ADMIN — HALOPERIDOL DECANOATE 5 MILLIGRAM(S): 100 INJECTION INTRAMUSCULAR at 22:30

## 2017-10-02 RX ADMIN — Medication 1 MILLIGRAM(S): at 21:01

## 2017-10-02 RX ADMIN — Medication 3 MILLIGRAM(S): at 21:30

## 2017-10-02 RX ADMIN — Medication 50 MILLIGRAM(S): at 15:10

## 2017-10-02 RX ADMIN — LITHIUM CARBONATE 900 MILLIGRAM(S): 300 TABLET, EXTENDED RELEASE ORAL at 21:01

## 2017-10-02 RX ADMIN — Medication 50 MILLIGRAM(S): at 21:30

## 2017-10-02 RX ADMIN — Medication 1.5 MILLIGRAM(S): at 09:01

## 2017-10-02 RX ADMIN — Medication 50 MILLIGRAM(S): at 22:30

## 2017-10-02 RX ADMIN — LAMOTRIGINE 25 MILLIGRAM(S): 25 TABLET, ORALLY DISINTEGRATING ORAL at 09:01

## 2017-10-02 RX ADMIN — GABAPENTIN 100 MILLIGRAM(S): 400 CAPSULE ORAL at 09:01

## 2017-10-02 RX ADMIN — Medication 50 MILLIGRAM(S): at 09:04

## 2017-10-02 RX ADMIN — GABAPENTIN 100 MILLIGRAM(S): 400 CAPSULE ORAL at 21:01

## 2017-10-02 RX ADMIN — QUETIAPINE FUMARATE 50 MILLIGRAM(S): 200 TABLET, FILM COATED ORAL at 21:01

## 2017-10-02 RX ADMIN — LAMOTRIGINE 25 MILLIGRAM(S): 25 TABLET, ORALLY DISINTEGRATING ORAL at 21:01

## 2017-10-02 RX ADMIN — PANTOPRAZOLE SODIUM 40 MILLIGRAM(S): 20 TABLET, DELAYED RELEASE ORAL at 09:19

## 2017-10-03 LAB
CHOLEST SERPL-MCNC: 133 MG/DL — SIGNIFICANT CHANGE UP (ref 120–199)
HBA1C BLD-MCNC: 5 % — SIGNIFICANT CHANGE UP (ref 4–5.6)
HDLC SERPL-MCNC: 38 MG/DL — LOW (ref 45–65)
LIPID PNL WITH DIRECT LDL SERPL: 77 MG/DL — SIGNIFICANT CHANGE UP
TRIGL SERPL-MCNC: 156 MG/DL — HIGH (ref 10–149)

## 2017-10-03 PROCEDURE — 90832 PSYTX W PT 30 MINUTES: CPT

## 2017-10-03 PROCEDURE — 99232 SBSQ HOSP IP/OBS MODERATE 35: CPT

## 2017-10-03 RX ADMIN — PANTOPRAZOLE SODIUM 40 MILLIGRAM(S): 20 TABLET, DELAYED RELEASE ORAL at 08:31

## 2017-10-03 RX ADMIN — GABAPENTIN 100 MILLIGRAM(S): 400 CAPSULE ORAL at 21:11

## 2017-10-03 RX ADMIN — QUETIAPINE FUMARATE 50 MILLIGRAM(S): 200 TABLET, FILM COATED ORAL at 21:11

## 2017-10-03 RX ADMIN — LAMOTRIGINE 25 MILLIGRAM(S): 25 TABLET, ORALLY DISINTEGRATING ORAL at 21:11

## 2017-10-03 RX ADMIN — GABAPENTIN 100 MILLIGRAM(S): 400 CAPSULE ORAL at 13:08

## 2017-10-03 RX ADMIN — LITHIUM CARBONATE 900 MILLIGRAM(S): 300 TABLET, EXTENDED RELEASE ORAL at 21:11

## 2017-10-03 RX ADMIN — Medication 1 MILLIGRAM(S): at 08:31

## 2017-10-03 RX ADMIN — Medication 50 MILLIGRAM(S): at 15:10

## 2017-10-03 RX ADMIN — GABAPENTIN 100 MILLIGRAM(S): 400 CAPSULE ORAL at 08:31

## 2017-10-03 RX ADMIN — Medication 1.5 MILLIGRAM(S): at 21:11

## 2017-10-03 RX ADMIN — Medication 50 MILLIGRAM(S): at 17:37

## 2017-10-03 RX ADMIN — Medication 50 MILLIGRAM(S): at 08:49

## 2017-10-03 RX ADMIN — Medication 3 MILLIGRAM(S): at 21:10

## 2017-10-03 RX ADMIN — HALOPERIDOL DECANOATE 5 MILLIGRAM(S): 100 INJECTION INTRAMUSCULAR at 17:36

## 2017-10-03 RX ADMIN — LAMOTRIGINE 25 MILLIGRAM(S): 25 TABLET, ORALLY DISINTEGRATING ORAL at 08:31

## 2017-10-04 PROCEDURE — 99232 SBSQ HOSP IP/OBS MODERATE 35: CPT

## 2017-10-04 RX ADMIN — LAMOTRIGINE 25 MILLIGRAM(S): 25 TABLET, ORALLY DISINTEGRATING ORAL at 08:59

## 2017-10-04 RX ADMIN — QUETIAPINE FUMARATE 50 MILLIGRAM(S): 200 TABLET, FILM COATED ORAL at 20:31

## 2017-10-04 RX ADMIN — Medication 50 MILLIGRAM(S): at 14:20

## 2017-10-04 RX ADMIN — GABAPENTIN 100 MILLIGRAM(S): 400 CAPSULE ORAL at 20:31

## 2017-10-04 RX ADMIN — Medication 1 EACH: at 13:50

## 2017-10-04 RX ADMIN — LITHIUM CARBONATE 900 MILLIGRAM(S): 300 TABLET, EXTENDED RELEASE ORAL at 20:31

## 2017-10-04 RX ADMIN — HALOPERIDOL DECANOATE 5 MILLIGRAM(S): 100 INJECTION INTRAMUSCULAR at 14:20

## 2017-10-04 RX ADMIN — PANTOPRAZOLE SODIUM 40 MILLIGRAM(S): 20 TABLET, DELAYED RELEASE ORAL at 08:59

## 2017-10-04 RX ADMIN — GABAPENTIN 100 MILLIGRAM(S): 400 CAPSULE ORAL at 13:39

## 2017-10-04 RX ADMIN — Medication 1 MILLIGRAM(S): at 08:59

## 2017-10-04 RX ADMIN — Medication 1 MILLIGRAM(S): at 20:31

## 2017-10-04 RX ADMIN — GABAPENTIN 100 MILLIGRAM(S): 400 CAPSULE ORAL at 08:59

## 2017-10-04 RX ADMIN — LAMOTRIGINE 25 MILLIGRAM(S): 25 TABLET, ORALLY DISINTEGRATING ORAL at 20:31

## 2017-10-05 PROCEDURE — 99232 SBSQ HOSP IP/OBS MODERATE 35: CPT

## 2017-10-05 RX ADMIN — Medication 50 MILLIGRAM(S): at 11:34

## 2017-10-05 RX ADMIN — GABAPENTIN 100 MILLIGRAM(S): 400 CAPSULE ORAL at 14:22

## 2017-10-05 RX ADMIN — HALOPERIDOL DECANOATE 5 MILLIGRAM(S): 100 INJECTION INTRAMUSCULAR at 22:22

## 2017-10-05 RX ADMIN — GABAPENTIN 100 MILLIGRAM(S): 400 CAPSULE ORAL at 08:54

## 2017-10-05 RX ADMIN — QUETIAPINE FUMARATE 50 MILLIGRAM(S): 200 TABLET, FILM COATED ORAL at 20:32

## 2017-10-05 RX ADMIN — Medication 50 MILLIGRAM(S): at 22:23

## 2017-10-05 RX ADMIN — GABAPENTIN 100 MILLIGRAM(S): 400 CAPSULE ORAL at 20:32

## 2017-10-05 RX ADMIN — LAMOTRIGINE 25 MILLIGRAM(S): 25 TABLET, ORALLY DISINTEGRATING ORAL at 20:32

## 2017-10-05 RX ADMIN — Medication 50 MILLIGRAM(S): at 20:30

## 2017-10-05 RX ADMIN — Medication 3 MILLIGRAM(S): at 20:30

## 2017-10-05 RX ADMIN — Medication 1 MILLIGRAM(S): at 08:54

## 2017-10-05 RX ADMIN — LITHIUM CARBONATE 900 MILLIGRAM(S): 300 TABLET, EXTENDED RELEASE ORAL at 20:32

## 2017-10-05 RX ADMIN — PANTOPRAZOLE SODIUM 40 MILLIGRAM(S): 20 TABLET, DELAYED RELEASE ORAL at 08:54

## 2017-10-05 RX ADMIN — LAMOTRIGINE 25 MILLIGRAM(S): 25 TABLET, ORALLY DISINTEGRATING ORAL at 08:54

## 2017-10-05 RX ADMIN — Medication 0.5 MILLIGRAM(S): at 20:32

## 2017-10-06 LAB
ALBUMIN SERPL ELPH-MCNC: 4.1 G/DL — SIGNIFICANT CHANGE UP (ref 3.3–5)
ALP SERPL-CCNC: 44 U/L — SIGNIFICANT CHANGE UP (ref 40–120)
ALT FLD-CCNC: 43 U/L — HIGH (ref 4–33)
AST SERPL-CCNC: 23 U/L — SIGNIFICANT CHANGE UP (ref 4–32)
BILIRUB SERPL-MCNC: 0.3 MG/DL — SIGNIFICANT CHANGE UP (ref 0.2–1.2)
BUN SERPL-MCNC: 13 MG/DL — SIGNIFICANT CHANGE UP (ref 7–23)
CALCIUM SERPL-MCNC: 8.9 MG/DL — SIGNIFICANT CHANGE UP (ref 8.4–10.5)
CHLORIDE SERPL-SCNC: 103 MMOL/L — SIGNIFICANT CHANGE UP (ref 98–107)
CO2 SERPL-SCNC: 25 MMOL/L — SIGNIFICANT CHANGE UP (ref 22–31)
CREAT SERPL-MCNC: 0.73 MG/DL — SIGNIFICANT CHANGE UP (ref 0.5–1.3)
GLUCOSE SERPL-MCNC: 73 MG/DL — SIGNIFICANT CHANGE UP (ref 70–99)
LITHIUM SERPL-MCNC: 0.72 MMOL/L — SIGNIFICANT CHANGE UP (ref 0.6–1.2)
POTASSIUM SERPL-MCNC: 4.5 MMOL/L — SIGNIFICANT CHANGE UP (ref 3.5–5.3)
POTASSIUM SERPL-SCNC: 4.5 MMOL/L — SIGNIFICANT CHANGE UP (ref 3.5–5.3)
PROT SERPL-MCNC: 7.1 G/DL — SIGNIFICANT CHANGE UP (ref 6–8.3)
SODIUM SERPL-SCNC: 140 MMOL/L — SIGNIFICANT CHANGE UP (ref 135–145)

## 2017-10-06 PROCEDURE — 99232 SBSQ HOSP IP/OBS MODERATE 35: CPT

## 2017-10-06 RX ORDER — LITHIUM CARBONATE 300 MG/1
3 TABLET, EXTENDED RELEASE ORAL
Qty: 0 | Refills: 0 | COMMUNITY
Start: 2017-10-06

## 2017-10-06 RX ORDER — NICOTINE POLACRILEX 2 MG
0 GUM BUCCAL
Qty: 0 | Refills: 0 | COMMUNITY
Start: 2017-10-06

## 2017-10-06 RX ORDER — GABAPENTIN 400 MG/1
1 CAPSULE ORAL
Qty: 0 | Refills: 0 | COMMUNITY
Start: 2017-10-06

## 2017-10-06 RX ORDER — HYDROXYZINE HCL 10 MG
1 TABLET ORAL
Qty: 0 | Refills: 0 | COMMUNITY
Start: 2017-10-06

## 2017-10-06 RX ORDER — LAMOTRIGINE 25 MG/1
1 TABLET, ORALLY DISINTEGRATING ORAL
Qty: 0 | Refills: 0 | COMMUNITY
Start: 2017-10-06

## 2017-10-06 RX ORDER — QUETIAPINE FUMARATE 200 MG/1
1 TABLET, FILM COATED ORAL
Qty: 0 | Refills: 0 | COMMUNITY
Start: 2017-10-06

## 2017-10-06 RX ADMIN — LITHIUM CARBONATE 900 MILLIGRAM(S): 300 TABLET, EXTENDED RELEASE ORAL at 20:52

## 2017-10-06 RX ADMIN — QUETIAPINE FUMARATE 50 MILLIGRAM(S): 200 TABLET, FILM COATED ORAL at 20:52

## 2017-10-06 RX ADMIN — HALOPERIDOL DECANOATE 5 MILLIGRAM(S): 100 INJECTION INTRAMUSCULAR at 21:01

## 2017-10-06 RX ADMIN — GABAPENTIN 100 MILLIGRAM(S): 400 CAPSULE ORAL at 20:52

## 2017-10-06 RX ADMIN — Medication 1 MILLIGRAM(S): at 08:07

## 2017-10-06 RX ADMIN — Medication 0.5 MILLIGRAM(S): at 20:52

## 2017-10-06 RX ADMIN — Medication 50 MILLIGRAM(S): at 21:01

## 2017-10-06 RX ADMIN — LAMOTRIGINE 25 MILLIGRAM(S): 25 TABLET, ORALLY DISINTEGRATING ORAL at 20:52

## 2017-10-06 RX ADMIN — LAMOTRIGINE 25 MILLIGRAM(S): 25 TABLET, ORALLY DISINTEGRATING ORAL at 08:07

## 2017-10-06 RX ADMIN — Medication 50 MILLIGRAM(S): at 20:52

## 2017-10-06 RX ADMIN — PANTOPRAZOLE SODIUM 40 MILLIGRAM(S): 20 TABLET, DELAYED RELEASE ORAL at 08:07

## 2017-10-06 RX ADMIN — Medication 3 MILLIGRAM(S): at 20:52

## 2017-10-06 RX ADMIN — GABAPENTIN 100 MILLIGRAM(S): 400 CAPSULE ORAL at 13:04

## 2017-10-06 RX ADMIN — HALOPERIDOL DECANOATE 5 MILLIGRAM(S): 100 INJECTION INTRAMUSCULAR at 14:45

## 2017-10-06 RX ADMIN — Medication 50 MILLIGRAM(S): at 14:44

## 2017-10-06 RX ADMIN — GABAPENTIN 100 MILLIGRAM(S): 400 CAPSULE ORAL at 08:07

## 2017-10-07 RX ADMIN — GABAPENTIN 100 MILLIGRAM(S): 400 CAPSULE ORAL at 08:55

## 2017-10-07 RX ADMIN — Medication 3 MILLIGRAM(S): at 20:22

## 2017-10-07 RX ADMIN — LAMOTRIGINE 25 MILLIGRAM(S): 25 TABLET, ORALLY DISINTEGRATING ORAL at 08:55

## 2017-10-07 RX ADMIN — LITHIUM CARBONATE 900 MILLIGRAM(S): 300 TABLET, EXTENDED RELEASE ORAL at 20:53

## 2017-10-07 RX ADMIN — QUETIAPINE FUMARATE 50 MILLIGRAM(S): 200 TABLET, FILM COATED ORAL at 20:53

## 2017-10-07 RX ADMIN — Medication 50 MILLIGRAM(S): at 20:53

## 2017-10-07 RX ADMIN — Medication 50 MILLIGRAM(S): at 20:22

## 2017-10-07 RX ADMIN — Medication 0.5 MILLIGRAM(S): at 20:53

## 2017-10-07 RX ADMIN — PANTOPRAZOLE SODIUM 40 MILLIGRAM(S): 20 TABLET, DELAYED RELEASE ORAL at 08:55

## 2017-10-07 RX ADMIN — GABAPENTIN 100 MILLIGRAM(S): 400 CAPSULE ORAL at 20:53

## 2017-10-07 RX ADMIN — Medication 0.5 MILLIGRAM(S): at 08:55

## 2017-10-08 RX ADMIN — Medication 50 MILLIGRAM(S): at 20:03

## 2017-10-08 RX ADMIN — Medication 50 MILLIGRAM(S): at 11:59

## 2017-10-08 RX ADMIN — QUETIAPINE FUMARATE 50 MILLIGRAM(S): 200 TABLET, FILM COATED ORAL at 21:37

## 2017-10-08 RX ADMIN — Medication 0.5 MILLIGRAM(S): at 21:37

## 2017-10-08 RX ADMIN — GABAPENTIN 100 MILLIGRAM(S): 400 CAPSULE ORAL at 12:18

## 2017-10-08 RX ADMIN — GABAPENTIN 100 MILLIGRAM(S): 400 CAPSULE ORAL at 21:37

## 2017-10-08 RX ADMIN — GABAPENTIN 100 MILLIGRAM(S): 400 CAPSULE ORAL at 09:33

## 2017-10-08 RX ADMIN — LITHIUM CARBONATE 900 MILLIGRAM(S): 300 TABLET, EXTENDED RELEASE ORAL at 21:37

## 2017-10-08 RX ADMIN — Medication 0.5 MILLIGRAM(S): at 09:34

## 2017-10-09 VITALS — TEMPERATURE: 207 F | RESPIRATION RATE: 18 BRPM

## 2017-10-09 PROCEDURE — 99238 HOSP IP/OBS DSCHRG MGMT 30/<: CPT

## 2017-10-09 RX ADMIN — Medication 1 EACH: at 08:42

## 2017-10-09 RX ADMIN — GABAPENTIN 100 MILLIGRAM(S): 400 CAPSULE ORAL at 08:55

## 2017-10-09 RX ADMIN — PANTOPRAZOLE SODIUM 40 MILLIGRAM(S): 20 TABLET, DELAYED RELEASE ORAL at 08:55

## 2017-10-11 RX ORDER — QUETIAPINE FUMARATE 200 MG/1
1 TABLET, FILM COATED ORAL
Qty: 15 | Refills: 0 | OUTPATIENT
Start: 2017-10-11 | End: 2017-10-26

## 2017-10-11 RX ORDER — LAMOTRIGINE 25 MG/1
1 TABLET, ORALLY DISINTEGRATING ORAL
Qty: 30 | Refills: 0 | OUTPATIENT
Start: 2017-10-11 | End: 2017-10-26

## 2017-10-11 RX ORDER — HYDROXYZINE HCL 10 MG
1 TABLET ORAL
Qty: 30 | Refills: 0 | OUTPATIENT
Start: 2017-10-11 | End: 2017-10-26

## 2017-10-11 RX ORDER — GABAPENTIN 400 MG/1
1 CAPSULE ORAL
Qty: 45 | Refills: 0 | OUTPATIENT
Start: 2017-10-11 | End: 2017-10-26

## 2017-10-11 RX ORDER — PANTOPRAZOLE SODIUM 20 MG/1
1 TABLET, DELAYED RELEASE ORAL
Qty: 15 | Refills: 0 | OUTPATIENT
Start: 2017-10-11 | End: 2017-10-26

## 2018-10-11 ENCOUNTER — TRANSCRIPTION ENCOUNTER (OUTPATIENT)
Age: 23
End: 2018-10-11

## 2018-10-22 NOTE — ED PROVIDER NOTE - DATE/TIME 2
currentmedications, diet and exercise. Patient agreed with treatment plan. Follow up as directed. MEDICATIONS:  No orders of the defined types were placed in this encounter. ORDERS:  No orders of the defined types were placed in this encounter. Follow-up:  Return in about 1 year (around 10/22/2019). PATIENT INSTRUCTIONS:  There are no Patient Instructions on file for this visit. Electronically signed by YASMINE De León Che, CNP on 10/22/2018 at 8:39 AM    EMR Dragon/transcription disclaimer:  Much of thisencounter note is electronic transcription/translation of spoken language to printed texts. The electronic translation of spoken language may be erroneous, or at times, nonsensical words or phrases may be inadvertentlytranscribed.   Although I have reviewed the note for such errors, some may still exist. 18-Sep-2017 18:02

## 2018-12-05 ENCOUNTER — EMERGENCY (EMERGENCY)
Facility: HOSPITAL | Age: 23
LOS: 1 days | Discharge: OTHER INSTITUTION | End: 2018-12-05
Attending: EMERGENCY MEDICINE
Payer: COMMERCIAL

## 2018-12-05 VITALS
TEMPERATURE: 98 F | DIASTOLIC BLOOD PRESSURE: 78 MMHG | SYSTOLIC BLOOD PRESSURE: 130 MMHG | RESPIRATION RATE: 14 BRPM | HEART RATE: 72 BPM | OXYGEN SATURATION: 99 %

## 2018-12-05 DIAGNOSIS — Z90.89 ACQUIRED ABSENCE OF OTHER ORGANS: Chronic | ICD-10-CM

## 2018-12-05 LAB
ALBUMIN SERPL ELPH-MCNC: 4.3 G/DL — SIGNIFICANT CHANGE UP (ref 3.3–5.2)
ALP SERPL-CCNC: 62 U/L — SIGNIFICANT CHANGE UP (ref 40–120)
ALT FLD-CCNC: 31 U/L — SIGNIFICANT CHANGE UP
ANION GAP SERPL CALC-SCNC: 12 MMOL/L — SIGNIFICANT CHANGE UP (ref 5–17)
APAP SERPL-MCNC: <7.5 UG/ML — LOW (ref 10–26)
APTT BLD: 25.5 SEC — LOW (ref 27.5–36.3)
AST SERPL-CCNC: 26 U/L — SIGNIFICANT CHANGE UP
BILIRUB SERPL-MCNC: 0.7 MG/DL — SIGNIFICANT CHANGE UP (ref 0.4–2)
BUN SERPL-MCNC: 14 MG/DL — SIGNIFICANT CHANGE UP (ref 8–20)
CALCIUM SERPL-MCNC: 9.2 MG/DL — SIGNIFICANT CHANGE UP (ref 8.6–10.2)
CHLORIDE SERPL-SCNC: 100 MMOL/L — SIGNIFICANT CHANGE UP (ref 98–107)
CO2 SERPL-SCNC: 25 MMOL/L — SIGNIFICANT CHANGE UP (ref 22–29)
CREAT SERPL-MCNC: 0.65 MG/DL — SIGNIFICANT CHANGE UP (ref 0.5–1.3)
ETHANOL SERPL-MCNC: <10 MG/DL — SIGNIFICANT CHANGE UP
GLUCOSE SERPL-MCNC: 80 MG/DL — SIGNIFICANT CHANGE UP (ref 70–115)
HCG SERPL-ACNC: <4 MIU/ML — SIGNIFICANT CHANGE UP
HCT VFR BLD CALC: 39.2 % — SIGNIFICANT CHANGE UP (ref 37–47)
HGB BLD-MCNC: 13.3 G/DL — SIGNIFICANT CHANGE UP (ref 12–16)
INR BLD: 1.33 RATIO — HIGH (ref 0.88–1.16)
MCHC RBC-ENTMCNC: 28.2 PG — SIGNIFICANT CHANGE UP (ref 27–31)
MCHC RBC-ENTMCNC: 33.9 G/DL — SIGNIFICANT CHANGE UP (ref 32–36)
MCV RBC AUTO: 83.2 FL — SIGNIFICANT CHANGE UP (ref 81–99)
PLATELET # BLD AUTO: 219 K/UL — SIGNIFICANT CHANGE UP (ref 150–400)
POTASSIUM SERPL-MCNC: 3.9 MMOL/L — SIGNIFICANT CHANGE UP (ref 3.5–5.3)
POTASSIUM SERPL-SCNC: 3.9 MMOL/L — SIGNIFICANT CHANGE UP (ref 3.5–5.3)
PROT SERPL-MCNC: 7.9 G/DL — SIGNIFICANT CHANGE UP (ref 6.6–8.7)
PROTHROM AB SERPL-ACNC: 15.4 SEC — HIGH (ref 10–12.9)
RBC # BLD: 4.71 M/UL — SIGNIFICANT CHANGE UP (ref 4.4–5.2)
RBC # FLD: 12.6 % — SIGNIFICANT CHANGE UP (ref 11–15.6)
SALICYLATES SERPL-MCNC: <0.6 MG/DL — LOW (ref 10–20)
SODIUM SERPL-SCNC: 137 MMOL/L — SIGNIFICANT CHANGE UP (ref 135–145)
WBC # BLD: 9 K/UL — SIGNIFICANT CHANGE UP (ref 4.8–10.8)
WBC # FLD AUTO: 9 K/UL — SIGNIFICANT CHANGE UP (ref 4.8–10.8)

## 2018-12-05 PROCEDURE — 99285 EMERGENCY DEPT VISIT HI MDM: CPT | Mod: 25

## 2018-12-05 PROCEDURE — 93010 ELECTROCARDIOGRAM REPORT: CPT

## 2018-12-05 NOTE — ED PROVIDER NOTE - OBJECTIVE STATEMENT
22 y/o F pt with PMHx bipolar disorder, borderline personality disorder presents to the ED with suicide notes s/p suicide attempt.  Pt has a note saying she is sad about the harm and trouble she has been causing her family.  Per father, pt was recently in rehab, clean for 5 months, was released one week ago, and began using street xanax.  Pt had a verbal argument with her mother today.  Father states pt has had previous suicide attempts, has been hospitalized before.

## 2018-12-05 NOTE — ED ADULT TRIAGE NOTE - CHIEF COMPLAINT QUOTE
pt took unknown amount of sleeping medication. unsure the name. pt sleepy at this time but responds to voice. pt left suicide note. pt placed in yellow gown at 1:1

## 2018-12-05 NOTE — ED PROVIDER NOTE - PROGRESS NOTE DETAILS
Mother's phone number (Corrine) [phone] Mother's phone number (Corrine) [phone].  Patient is still stable but still sedated. Mother concern about possible OD on Abilify 20mg(20-30 tabs). Pt. may have taken those meds last night  between 7-8PM.  Pt. still at . Will consult with Poison Center. Spoke to Poison Center(umang Neville). They recommended 12 hour observation and Check possible lithium level.  AS per psych,-Dr. Crawley, states that pt. is not taking Lithium.  working on transfer for inpatient psychiatric treatment.

## 2018-12-05 NOTE — ED ADULT NURSE NOTE - NSIMPLEMENTINTERV_GEN_ALL_ED
Implemented All Fall Risk Interventions:  [place] to call system. Call bell, personal items and telephone within reach. Instruct patient to call for assistance. Room bathroom lighting operational. Non-slip footwear when patient is off stretcher. Physically safe environment: no spills, clutter or unnecessary equipment. Stretcher in lowest position, wheels locked, appropriate side rails in place. Provide visual cue, wrist band, yellow gown, etc. Monitor gait and stability. Monitor for mental status changes and reorient to person, place, and time. Review medications for side effects contributing to fall risk. Reinforce activity limits and safety measures with patient and family.

## 2018-12-05 NOTE — ED PROVIDER NOTE - MEDICAL DECISION MAKING DETAILS
pt with possible overdose, SI given note presented to us in the ED, will obtain psychiatric evaluation when pt is awake.

## 2018-12-06 VITALS
SYSTOLIC BLOOD PRESSURE: 118 MMHG | DIASTOLIC BLOOD PRESSURE: 77 MMHG | OXYGEN SATURATION: 98 % | TEMPERATURE: 99 F | HEART RATE: 81 BPM | RESPIRATION RATE: 18 BRPM

## 2018-12-06 DIAGNOSIS — F31.4 BIPOLAR DISORDER, CURRENT EPISODE DEPRESSED, SEVERE, WITHOUT PSYCHOTIC FEATURES: ICD-10-CM

## 2018-12-06 DIAGNOSIS — R69 ILLNESS, UNSPECIFIED: ICD-10-CM

## 2018-12-06 DIAGNOSIS — F19.10 OTHER PSYCHOACTIVE SUBSTANCE ABUSE, UNCOMPLICATED: ICD-10-CM

## 2018-12-06 PROBLEM — F60.3 BORDERLINE PERSONALITY DISORDER: Chronic | Status: ACTIVE | Noted: 2017-09-18

## 2018-12-06 PROBLEM — F31.9 BIPOLAR DISORDER, UNSPECIFIED: Chronic | Status: ACTIVE | Noted: 2017-09-18

## 2018-12-06 LAB
AMPHET UR-MCNC: POSITIVE
APPEARANCE UR: CLEAR — SIGNIFICANT CHANGE UP
BACTERIA # UR AUTO: ABNORMAL
BARBITURATES UR SCN-MCNC: NEGATIVE — SIGNIFICANT CHANGE UP
BENZODIAZ UR-MCNC: POSITIVE
BILIRUB UR-MCNC: ABNORMAL
COCAINE METAB.OTHER UR-MCNC: POSITIVE
COLOR SPEC: ABNORMAL
DIFF PNL FLD: ABNORMAL
EPI CELLS # UR: SIGNIFICANT CHANGE UP
GLUCOSE UR QL: NEGATIVE MG/DL — SIGNIFICANT CHANGE UP
KETONES UR-MCNC: ABNORMAL
LEUKOCYTE ESTERASE UR-ACNC: ABNORMAL
LITHIUM SERPL-MCNC: <.1 MMOL/L — LOW (ref 0.5–1.5)
METHADONE UR-MCNC: NEGATIVE — SIGNIFICANT CHANGE UP
NITRITE UR-MCNC: POSITIVE
OPIATES UR-MCNC: NEGATIVE — SIGNIFICANT CHANGE UP
PCP SPEC-MCNC: SIGNIFICANT CHANGE UP
PCP UR-MCNC: NEGATIVE — SIGNIFICANT CHANGE UP
PH UR: 5 — SIGNIFICANT CHANGE UP (ref 5–8)
PROT UR-MCNC: 100 MG/DL
RBC CASTS # UR COMP ASSIST: ABNORMAL /HPF (ref 0–4)
SP GR SPEC: 1.02 — SIGNIFICANT CHANGE UP (ref 1.01–1.02)
THC UR QL: POSITIVE
UROBILINOGEN FLD QL: 4 MG/DL
WBC UR QL: SIGNIFICANT CHANGE UP

## 2018-12-06 PROCEDURE — 85730 THROMBOPLASTIN TIME PARTIAL: CPT

## 2018-12-06 PROCEDURE — 70450 CT HEAD/BRAIN W/O DYE: CPT | Mod: 26

## 2018-12-06 PROCEDURE — 85610 PROTHROMBIN TIME: CPT

## 2018-12-06 PROCEDURE — 93010 ELECTROCARDIOGRAM REPORT: CPT

## 2018-12-06 PROCEDURE — 36415 COLL VENOUS BLD VENIPUNCTURE: CPT

## 2018-12-06 PROCEDURE — 93005 ELECTROCARDIOGRAM TRACING: CPT

## 2018-12-06 PROCEDURE — 70450 CT HEAD/BRAIN W/O DYE: CPT

## 2018-12-06 PROCEDURE — 82962 GLUCOSE BLOOD TEST: CPT

## 2018-12-06 PROCEDURE — 80178 ASSAY OF LITHIUM: CPT

## 2018-12-06 PROCEDURE — 81001 URINALYSIS AUTO W/SCOPE: CPT

## 2018-12-06 PROCEDURE — 80053 COMPREHEN METABOLIC PANEL: CPT

## 2018-12-06 PROCEDURE — 84702 CHORIONIC GONADOTROPIN TEST: CPT

## 2018-12-06 PROCEDURE — 80307 DRUG TEST PRSMV CHEM ANLYZR: CPT

## 2018-12-06 PROCEDURE — 99285 EMERGENCY DEPT VISIT HI MDM: CPT

## 2018-12-06 PROCEDURE — 99284 EMERGENCY DEPT VISIT MOD MDM: CPT

## 2018-12-06 PROCEDURE — 85027 COMPLETE CBC AUTOMATED: CPT

## 2018-12-06 NOTE — ED ADULT NURSE REASSESSMENT NOTE - STATUS
awaiting consult
awaiting transfer, no change
awaiting consult
awaiting discharge, no change
awaiting transfer, no change
awaiting transfer, no change

## 2018-12-06 NOTE — ED ADULT NURSE REASSESSMENT NOTE - GENERAL PATIENT STATE
comfortable appearance/cooperative/resting/sleeping
comfortable appearance/cooperative/resting/sleeping/drowsy
cooperative
cooperative/resting/sleeping/comfortable appearance
resting/sleeping/comfortable appearance
resting/sleeping/comfortable appearance

## 2018-12-06 NOTE — ED ADULT NURSE REASSESSMENT NOTE - COMFORT CARE
plan of care explained/po fluids offered/wait time explained
darkened lights/meal provided/wait time explained/plan of care explained/repositioned/warm blanket provided
darkened lights
plan of care explained
plan of care explained/meal provided
po fluids offered/plan of care explained

## 2018-12-06 NOTE — ED BEHAVIORAL HEALTH NOTE - BEHAVIORAL HEALTH NOTE
BEVERLEY Note: Pt seen by psychiatry and recommendation is to transfer pt for inpt psychiatric care. Referral made to [facility]. Chart being reviewed. UA still pending.9.37 legals charted. SW to follow

## 2018-12-06 NOTE — ED BEHAVIORAL HEALTH ASSESSMENT NOTE - HPI (INCLUDE ILLNESS QUALITY, SEVERITY, DURATION, TIMING, CONTEXT, MODIFYING FACTORS, ASSOCIATED SIGNS AND SYMPTOMS)
22 y/o female with questionable bipolar and borderline presents after SA. Pt admitted to taking Abilify with intent on ending her life, mom believes around 40-50 pills, two different strengths:15mg and 20mg. Pt left suicide note. This AM pt stated she is not wanting to be admitted. Mother stated pt has known history of drug abuse and was clean until a week ago when she relapsed on street Xanax. Pt stated to mother she wants help so mother took her to [facility], but pt refused detox. Mother states pt has multi-drug use with cocaine, xanax, THC. Mother stated she has had several referrals for outpatient psychiatric care and therapists but has not sought out care. Poison control contacted by Dr. Martin.  She stated she was admitted to [facility] 5-6 years ago for SA. Suicide note in chart

## 2018-12-06 NOTE — ED BEHAVIORAL HEALTH ASSESSMENT NOTE - OTHER PAST PSYCHIATRIC HISTORY (INCLUDE DETAILS REGARDING ONSET, COURSE OF ILLNESS, INPATIENT/OUTPATIENT TREATMENT)
[facility] 5-6 years prior for SA  questionable diagnosis of bipolar d/o and borderline personality d/o  no current provider

## 2018-12-06 NOTE — ED BEHAVIORAL HEALTH ASSESSMENT NOTE - DESCRIPTION
sleeping   T(C): 36.9 (06 Dec 2018 07:04), Max: 36.9 (06 Dec 2018 07:04)  T(F): 98.4 (06 Dec 2018 07:04), Max: 98.4 (06 Dec 2018 07:04)  HR: 67 (06 Dec 2018 07:04) (67 - 72)  BP: 104/69 (06 Dec 2018 07:04) (104/69 - 130/78)  RR: 19 (06 Dec 2018 07:04) (14 - 19)  SpO2: 98% (06 Dec 2018 07:04) (98% - 99%) questionable bipolar and borderline PD Pt abuses illegal drugs

## 2018-12-06 NOTE — ED BEHAVIORAL HEALTH ASSESSMENT NOTE - SUMMARY
22 y/o female presenting post SA with ingestion of "40-50" Abilify. D/t hx of SA (5-6 yrs ago) and attempt yesterday recommend inpatient psychiatric admission.

## 2018-12-06 NOTE — ED ADULT NURSE REASSESSMENT NOTE - NS ED NURSE REASSESS COMMENT FT1
patient alert at this time skin warm and dry color good patient transferred to behavioral health
Assumed care of pt @1930. Pt currently awaiting transfer to [facility]. Plan of care explained to pt. Pt currently resting in bed, in no distress, easily arousable. Will monitor the pt for safety.
received pt medically cleared by MD Lucas. Pt in yellow gowns, drowsy, unsteady on her feet. pt cooperative with security check. pt report she attempted to over dose on "abilify" as a SA. Pt states she has a hx of borderline personality disorder, bipolar disorder, anxiety and depression. Pt has a hx of SA by overdose and psychiatric hospitalization, last hospitalization x1 year at [facility]. Pt admits to recent cocaine, xanax and marijuana use/abuse. Pt denies current suicidal ideations ,HI any A/V/H. VSS. Pt currently resting comfortably in bed, in no distress. Report given to Tom Robertson RN
Assumed care of patient at 0715.  Patient asleep in bed with no distress noted.  Safety of patient maintained.
Patient awoken for lunch.  Patient educated about proper urine collection.  Patient provided urine sample which is reddish in color.  Patient endorses having her menses.  Patient able to walk with slow steady gait.  Patient denies suicidal or homicidal ideation.  Patient educated about plan of care and pending transfer.  Patient verbalized understanding of plan of care.  Declined lunch and returned to bed.
Patient resting in bed with no distress.  Patient responding appropriately during verbal interactions.  Safety of patient maintained.
Patient educated about pending urine collection which patient endorsed she will comply when she has to void.  PO fluids provided to patient.  Patient offered breakfast and declined.  No attempts to harm self or others.  Patient continues to be resting in bed sleeping.  Safety of patient maintained.

## 2018-12-06 NOTE — ED BEHAVIORAL HEALTH ASSESSMENT NOTE - SUICIDE RISK FACTORS
Access to means (pills, firearms, etc.)/Substance abuse/dependence/Perceived burden on family and others/Hopelessness/Mood episode

## 2019-05-07 ENCOUNTER — TRANSCRIPTION ENCOUNTER (OUTPATIENT)
Age: 24
End: 2019-05-07

## 2019-08-18 ENCOUNTER — EMERGENCY (EMERGENCY)
Facility: HOSPITAL | Age: 24
LOS: 1 days | Discharge: HOME | End: 2019-08-18
Attending: STUDENT IN AN ORGANIZED HEALTH CARE EDUCATION/TRAINING PROGRAM
Payer: COMMERCIAL

## 2019-08-18 VITALS
RESPIRATION RATE: 18 BRPM | HEIGHT: 66 IN | HEART RATE: 84 BPM | SYSTOLIC BLOOD PRESSURE: 109 MMHG | TEMPERATURE: 99 F | WEIGHT: 199.96 LBS | DIASTOLIC BLOOD PRESSURE: 80 MMHG | OXYGEN SATURATION: 97 %

## 2019-08-18 VITALS
TEMPERATURE: 97 F | SYSTOLIC BLOOD PRESSURE: 99 MMHG | OXYGEN SATURATION: 98 % | DIASTOLIC BLOOD PRESSURE: 48 MMHG | RESPIRATION RATE: 18 BRPM | HEART RATE: 85 BPM

## 2019-08-18 DIAGNOSIS — Z90.89 ACQUIRED ABSENCE OF OTHER ORGANS: Chronic | ICD-10-CM

## 2019-08-18 LAB
ALBUMIN SERPL ELPH-MCNC: 4.6 G/DL — SIGNIFICANT CHANGE UP (ref 3.3–5.2)
ALP SERPL-CCNC: 59 U/L — SIGNIFICANT CHANGE UP (ref 40–120)
ALT FLD-CCNC: 27 U/L — SIGNIFICANT CHANGE UP
ANION GAP SERPL CALC-SCNC: 15 MMOL/L — SIGNIFICANT CHANGE UP (ref 5–17)
AST SERPL-CCNC: 26 U/L — SIGNIFICANT CHANGE UP
BILIRUB SERPL-MCNC: 0.4 MG/DL — SIGNIFICANT CHANGE UP (ref 0.4–2)
BUN SERPL-MCNC: 16 MG/DL — SIGNIFICANT CHANGE UP (ref 8–20)
CALCIUM SERPL-MCNC: 9.4 MG/DL — SIGNIFICANT CHANGE UP (ref 8.6–10.2)
CHLORIDE SERPL-SCNC: 104 MMOL/L — SIGNIFICANT CHANGE UP (ref 98–107)
CO2 SERPL-SCNC: 21 MMOL/L — LOW (ref 22–29)
CREAT SERPL-MCNC: 0.68 MG/DL — SIGNIFICANT CHANGE UP (ref 0.5–1.3)
CRP SERPL-MCNC: 0.46 MG/DL — HIGH (ref 0–0.4)
ERYTHROCYTE [SEDIMENTATION RATE] IN BLOOD: 15 MM/HR — SIGNIFICANT CHANGE UP (ref 0–20)
GLUCOSE SERPL-MCNC: 84 MG/DL — SIGNIFICANT CHANGE UP (ref 70–115)
HCT VFR BLD CALC: 40.9 % — SIGNIFICANT CHANGE UP (ref 34.5–45)
HGB BLD-MCNC: 13.9 G/DL — SIGNIFICANT CHANGE UP (ref 11.5–15.5)
MCHC RBC-ENTMCNC: 28.8 PG — SIGNIFICANT CHANGE UP (ref 27–34)
MCHC RBC-ENTMCNC: 34 GM/DL — SIGNIFICANT CHANGE UP (ref 32–36)
MCV RBC AUTO: 84.7 FL — SIGNIFICANT CHANGE UP (ref 80–100)
PLATELET # BLD AUTO: 290 K/UL — SIGNIFICANT CHANGE UP (ref 150–400)
POTASSIUM SERPL-MCNC: 3.8 MMOL/L — SIGNIFICANT CHANGE UP (ref 3.5–5.3)
POTASSIUM SERPL-SCNC: 3.8 MMOL/L — SIGNIFICANT CHANGE UP (ref 3.5–5.3)
PROT SERPL-MCNC: 7.8 G/DL — SIGNIFICANT CHANGE UP (ref 6.6–8.7)
RBC # BLD: 4.83 M/UL — SIGNIFICANT CHANGE UP (ref 3.8–5.2)
RBC # FLD: 12.5 % — SIGNIFICANT CHANGE UP (ref 10.3–14.5)
SODIUM SERPL-SCNC: 140 MMOL/L — SIGNIFICANT CHANGE UP (ref 135–145)
WBC # BLD: 9.42 K/UL — SIGNIFICANT CHANGE UP (ref 3.8–10.5)
WBC # FLD AUTO: 9.42 K/UL — SIGNIFICANT CHANGE UP (ref 3.8–10.5)

## 2019-08-18 PROCEDURE — 86140 C-REACTIVE PROTEIN: CPT

## 2019-08-18 PROCEDURE — 36415 COLL VENOUS BLD VENIPUNCTURE: CPT

## 2019-08-18 PROCEDURE — 70450 CT HEAD/BRAIN W/O DYE: CPT | Mod: 26

## 2019-08-18 PROCEDURE — 73630 X-RAY EXAM OF FOOT: CPT | Mod: 26,50

## 2019-08-18 PROCEDURE — 73610 X-RAY EXAM OF ANKLE: CPT | Mod: 26,50

## 2019-08-18 PROCEDURE — 72131 CT LUMBAR SPINE W/O DYE: CPT | Mod: 26

## 2019-08-18 PROCEDURE — 99284 EMERGENCY DEPT VISIT MOD MDM: CPT | Mod: 25

## 2019-08-18 PROCEDURE — 96372 THER/PROPH/DIAG INJ SC/IM: CPT

## 2019-08-18 PROCEDURE — 93970 EXTREMITY STUDY: CPT

## 2019-08-18 PROCEDURE — 80053 COMPREHEN METABOLIC PANEL: CPT

## 2019-08-18 PROCEDURE — 85652 RBC SED RATE AUTOMATED: CPT

## 2019-08-18 PROCEDURE — 73630 X-RAY EXAM OF FOOT: CPT

## 2019-08-18 PROCEDURE — 99284 EMERGENCY DEPT VISIT MOD MDM: CPT

## 2019-08-18 PROCEDURE — 70450 CT HEAD/BRAIN W/O DYE: CPT

## 2019-08-18 PROCEDURE — 84702 CHORIONIC GONADOTROPIN TEST: CPT

## 2019-08-18 PROCEDURE — 85027 COMPLETE CBC AUTOMATED: CPT

## 2019-08-18 PROCEDURE — 72131 CT LUMBAR SPINE W/O DYE: CPT

## 2019-08-18 PROCEDURE — 73610 X-RAY EXAM OF ANKLE: CPT

## 2019-08-18 PROCEDURE — 73590 X-RAY EXAM OF LOWER LEG: CPT

## 2019-08-18 PROCEDURE — 93970 EXTREMITY STUDY: CPT | Mod: 26

## 2019-08-18 PROCEDURE — 73590 X-RAY EXAM OF LOWER LEG: CPT | Mod: 26,50

## 2019-08-18 RX ORDER — KETOROLAC TROMETHAMINE 30 MG/ML
60 SYRINGE (ML) INJECTION ONCE
Refills: 0 | Status: DISCONTINUED | OUTPATIENT
Start: 2019-08-18 | End: 2019-08-18

## 2019-08-18 RX ORDER — ACETAMINOPHEN 500 MG
975 TABLET ORAL ONCE
Refills: 0 | Status: COMPLETED | OUTPATIENT
Start: 2019-08-18 | End: 2019-08-18

## 2019-08-18 RX ORDER — KETOROLAC TROMETHAMINE 30 MG/ML
30 SYRINGE (ML) INJECTION ONCE
Refills: 0 | Status: DISCONTINUED | OUTPATIENT
Start: 2019-08-18 | End: 2019-08-18

## 2019-08-18 RX ORDER — IBUPROFEN 200 MG
1 TABLET ORAL
Qty: 30 | Refills: 0
Start: 2019-08-18

## 2019-08-18 RX ADMIN — Medication 60 MILLIGRAM(S): at 06:45

## 2019-08-18 RX ADMIN — Medication 60 MILLIGRAM(S): at 06:15

## 2019-08-18 RX ADMIN — Medication 975 MILLIGRAM(S): at 09:06

## 2019-08-18 NOTE — ED ADULT TRIAGE NOTE - CHIEF COMPLAINT QUOTE
patient states that she has been collapsing, legs has been giving out for over 2 weeks in pain, seen by PMD and said she might have Lupus not on medication, wants an evaluation for weakness and pain due to DX of Lupus , **Patient is a recovering addict and will not take any narcotics ( 9 months clean), asking for help with DX to confirm

## 2019-08-18 NOTE — ED PROVIDER NOTE - NS ED ROS FT
GENERAL:   No fever, no chills  SKIN:   No Rashes  EYE:   Vision unchanged.  ENMT:   Denies ear pain.   PULM:   Denies SOB.  CVS:   Denies Chest Pain.  GI:   Denies abdominal pain.  : Denies dysuria.   MSK: Endorses leg weakness, pain  NEURO: Endorses mild HA

## 2019-08-18 NOTE — ED PROVIDER NOTE - OBJECTIVE STATEMENT
25 y/o F p/w c/o B/L LE pain from foot to knees w/ multiple episodes of intermittent weakness causing calls for past 3 weeks. Px to [facility] ED today due to two falls in one day. Denies head trauma, LOC. 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 Pt also currently being w/u by PMD for lupus. Pt is in recovery from opiate use disorder and requests not to be given narcotic analgesia. Pt denies trauma to BL LE. Denies fevers, denies change in vision. Endorses occaisonal HA. Denies n/v.

## 2019-08-18 NOTE — ED PROVIDER NOTE - PROGRESS NOTE DETAILS
Pt signed out to me by Dr. Goldberg pending CTs and reassessment.  Pt ambulating in ER without difficulty or assistance.  CTs negative. PT has a neurologist that she is scheduled to see. Pt instructed to f/up with neurology and to return for any new/concerning symptoms. Pt given a copy of all results and instructed to f/up with pcp regarding any abnormal results.

## 2019-08-18 NOTE — ED PROVIDER NOTE - ATTENDING CONTRIBUTION TO CARE
24y od female with complaints of zuly  le weakness, patinet is ambulatory, distal muscle stength, distal pulses ,patient will benefit with outptient neurology evaluation, I personally saw the patient with the resident, and completed the key components of the history and physical exam. I then discussed the management plan with the resident.

## 2019-08-18 NOTE — ED PROVIDER NOTE - CLINICAL SUMMARY MEDICAL DECISION MAKING FREE TEXT BOX
23 y/o F c/o episodic B/L LE weakness and tenderness. 25 y/o F c/o episodic B/L LE weakness and tenderness. CT Head, Lumbar Spine, XR B/L foot/ankle/tib-fib and US venous duplex doppler B/L negative for bleed, fx, fx, fx, fx, thrombosis respectively. Patient signed out by Dr. Goldberg to daytime attending, please see progress notes.

## 2019-08-18 NOTE — ED ADULT NURSE NOTE - NSIMPLEMENTINTERV_GEN_ALL_ED
Implemented All Fall Risk Interventions:  [place] to call system. Call bell, personal items and telephone within reach. Instruct patient to call for assistance. Room bathroom lighting operational. Non-slip footwear when patient is off stretcher. Physically safe environment: no spills, clutter or unnecessary equipment. Stretcher in lowest position, wheels locked, appropriate side rails in place. Provide visual cue, wrist band, yellow gown, etc. Monitor gait and stability. Monitor for mental status changes and reorient to person, place, and time. Review medications for side effects contributing to fall risk. Reinforce activity limits and safety measures with patient and family.

## 2019-08-18 NOTE — ED PROVIDER NOTE - PHYSICAL EXAMINATION
GENERAL:  WNWD Young F  EYE: EOMI, symmetrical lids, normal conjunctiva  ENMT: Atraumatic external nose and ears, moist mucous membranes  NECK: Symmetric, no tracheal deviation  CVS: RRR, No murmurs appreciated.  RESP: Unlabored respiratory effort, no central cyanosis, no evidence for respiratory distress, lungs CTAB  GI: Abdomen is non-distended, non-tympanic, soft and non-tender.  MSK: No gross deformities of B/L LE. Pulses +3 B/L LE. TTP over B/L LE from feet to knees. No TTP above B/L patellae. Negative anterior and posterior drawer test B/L. Patellae freely mobile B/L. Sensation intact throughout. No rashes. No palpable cord. Negative waqar sign B/L. No ROM limitation. Strength 5/5 all four extremities. Walk test demonstrating shuffling gait.  DERM: Skin is warm, dry. No rashes or lesions appreciated during exam.  NEURO: AAOx3. Moving all 4 extremities without limitation. No facial droop. No dysarthria.  PSYCH: Appropriate mood and affect during encounter.

## 2019-09-09 NOTE — ED ADULT TRIAGE NOTE - MEANS OF ARRIVAL
p/w SOB with  wheezing   s/p :  Duo neb, Solu poli, Mg So4 in Ed  -CXR, CT chest; as above   -prednisone completed  -c/w  Riley Guo following  pt still has frequent cough. stretcher CXR, CT chest; as above   Completed prednisone   c/w Riley   Pulmonary, Dr Guo following

## 2021-03-09 NOTE — PROGRESS NOTE ADULT - PROBLEM SELECTOR PROBLEM 7
Internal Medicine progress note    Presenting Complaint:  Aortic stenosis, acute renal failure.    Subjective:  Awake, comfortable.  Denies any chest pain, shortness of breath.    Physical Exam:  GENERAL: Alert and oriented x3, interacts very well, follows commands. Memory, mood and affect are at baseline.    Visit Vitals  /72   Pulse 76   Temp 97.4 °F (36.3 °C) (Oral)   Resp 16   Ht 5' 2\" (1.575 m)   Wt 87.1 kg   SpO2 98%   BMI 35.12 kg/m²       SKIN: Warm and dry.  HEENT: Both pupils are equal and responding to light. Nose: Both nostrils patent.  EXTREMITIES: Trace edema. Pedal pulses are very palpable.  NECK: Supple. Lymph nodes are not felt in the neck region.       LUNGS:  Lungs show coarse breath sounds bilaterally with moist crackles  CARDIOVASCULAR: S1, S2 with displaced PMI.ESM  ABDOMEN: Soft, bowel sounds positive, nontender.  RECTAL AND PELVIC: Not performed.  CENTRAL NERVOUS SYSTEM: Cranial nerves are grossly intact 2-12. Motor strength both upper and lower extremities, positive 5/5. Reflexes, bulk, tone baseline. Sensory: Fine touch is intact. Gait not assessed.       Labs:  Recent Labs   Lab 03/08/21  0615   WBC 5.5   RBC 4.74   HGB 12.9*   HCT 40.5        Recent Labs   Lab 03/08/21  0615 03/07/21  0834 03/06/21  1756 03/06/21  0427   SODIUM 137 139  --  138   POTASSIUM 4.3 4.5 3.8 4.0   CHLORIDE 108* 109*  --  106   CO2 21 17*  --  25   BUN 22* 26*  --  21*   CREATININE 1.66* 1.77*  --  1.61*   GLUCOSE 92 85  --  86   CALCIUM 8.8 9.0  --  8.8     ECHO 02/03/2021  Moderate low-flow, low-gradient aortic valve stenosis. Mean gradient = 12 mmHg; JOSH = 1.1 cm2; DI = 0.35.  Normal LV size and systolic function, EF 52%. GLS -5%. SV = 49 mL. Regional wall motion abnormalities (see diagram).  Thickened mitral valve with MAC; mean gradient 5 mmHg (HR 82 bmp). Mild MR.  Mildly increased RV size with normal systolic function.  S/P walking: Aortic valve mean gradient increased to 18 mmHg; mitral valve  mean gradient increased to 9 mmHg.  No pericardial effusion. No prior study available for comparison.     Cardiac catheterization:  · Ost LAD to Mid LAD lesion with 100% stenosis.  · Ost Cx to Mid Cx lesion with 90% stenosis.  · Origin to Prox Graft lesion before 1st Diag with 100% stenosis.  · Prox RCA to Dist RCA lesion with 100% stenosis.  · Prox Graft lesion with 90% stenosis.  · Dist Graft lesion with 80% stenosis.    Impression:   N17.9 Acute kidney failure, unspecified  I35.0 Nonrheumatic aortic (valve) stenosis  I25.10 Atherosclerotic heart disease of native coronary artery without angina pectoris  I10 Essential Hypertension  N18.3 Chronic kidney disease, stage 3 (moderate)  I73.9 Peripheral vascular disease, unspecified          Plan:  S/p Coronary angio, bypass graft angio, PTCA and stenting  Cap IV.  Continue aspirin, statin, Plavix  Continue heparin gtt for now  Continue metoprolol, Norvasc.  Hydralazine added  Heparin for DVT prophylaxis  Prior baseline was 1.5-1.7 in Feb 2019, pre-op Cr today: 1.9 - patient has not followed up with nephrology since 2019 therefore unsure what current baseline is  Received 40 mL of contrast 3/5        Miguel Null MD           Prophylactic measure

## 2023-04-18 NOTE — DISCHARGE NOTE ADULT - NS AS DC AMI YN
[FreeTextEntry1] : Radiographs 3 views of the left little finger show good position alignment and healing of the left little finger proximal phalanx fracture in the cast. no

## 2023-09-26 NOTE — ED ADULT TRIAGE NOTE - HEART RATE (BEATS/MIN)
[age]yo healthy F who is currently in her second trimester of first pregnancy who presents to clinic for evaluation of asymptomatic 1-2mm red macules. The macules do not misa with digital pressure and are distributed on the right dorsal hand and arm to the level of the elbow. No pain, no itching. No change in medications or complications with pregnancy. No history of bleeding disorders, aspirin use, or autoimmune conditions. Biopsy today to rule out underlying vasculitis, though my suspicion is low. I favor a pigmented purpruic dermatosis that that not require any additional treatment. I will call and speak with her OB provider and consider a liver panel to be safe. Detail Level: Zone

## 2023-10-02 ENCOUNTER — APPOINTMENT (RX ONLY)
Dept: URBAN - METROPOLITAN AREA CLINIC 111 | Facility: CLINIC | Age: 28
Setting detail: DERMATOLOGY
End: 2023-10-02

## 2023-10-02 DIAGNOSIS — L72.0 EPIDERMAL CYST: ICD-10-CM

## 2023-10-02 DIAGNOSIS — L82.0 INFLAMED SEBORRHEIC KERATOSIS: ICD-10-CM

## 2023-10-02 DIAGNOSIS — L91.8 OTHER HYPERTROPHIC DISORDERS OF THE SKIN: ICD-10-CM

## 2023-10-02 DIAGNOSIS — L70.0 ACNE VULGARIS: ICD-10-CM

## 2023-10-02 PROCEDURE — ? DEFER

## 2023-10-02 PROCEDURE — ? COUNSELING

## 2023-10-02 PROCEDURE — 99203 OFFICE O/P NEW LOW 30 MIN: CPT

## 2023-10-02 PROCEDURE — ? PRESCRIPTION MEDICATION MANAGEMENT

## 2023-10-02 PROCEDURE — ? PRESCRIPTION

## 2023-10-02 RX ORDER — ADAPALENE 3 MG/G
GEL TOPICAL
Qty: 45 | Refills: 2 | Status: ERX | COMMUNITY
Start: 2023-10-02

## 2023-10-02 RX ORDER — CLINDAMYCIN PHOSPHATE AND BENZOYL PEROXIDE 12; 50 MG/G; MG/G
GEL TOPICAL
Qty: 45 | Refills: 3 | Status: ERX | COMMUNITY
Start: 2023-10-02

## 2023-10-02 RX ADMIN — ADAPALENE: 3 GEL TOPICAL at 00:00

## 2023-10-02 RX ADMIN — CLINDAMYCIN PHOSPHATE AND BENZOYL PEROXIDE: 12; 50 GEL TOPICAL at 00:00

## 2023-10-02 ASSESSMENT — LOCATION SIMPLE DESCRIPTION DERM
LOCATION SIMPLE: INFERIOR FOREHEAD
LOCATION SIMPLE: ABDOMEN
LOCATION SIMPLE: GROIN

## 2023-10-02 ASSESSMENT — LOCATION ZONE DERM
LOCATION ZONE: TRUNK
LOCATION ZONE: VULVA
LOCATION ZONE: FACE

## 2023-10-02 ASSESSMENT — LOCATION DETAILED DESCRIPTION DERM
LOCATION DETAILED: MONS PUBIS
LOCATION DETAILED: INFERIOR MID FOREHEAD
LOCATION DETAILED: LEFT RIB CAGE

## 2023-10-02 NOTE — HPI: SKIN LESION
Is This A New Presentation, Or A Follow-Up?: Skin Lesion
What Type Of Note Output Would You Prefer (Optional)?: Bullet Format
How Severe Is Your Skin Lesion?: moderate
Has Your Skin Lesion Been Treated?: not been treated
Additional History: Pt states mole has grown drastically within the year.\\n\\nOf note patient has PTSD in genital area.
Additional History: Pt states skin tag was treated in the past. Skin tag regrew and fell on its own now is present again.

## 2023-10-02 NOTE — PROCEDURE: COUNSELING
Detail Level: Detailed
Topical Retinoid Pregnancy And Lactation Text: This medication is Pregnancy Category C. It is unknown if this medication is excreted in breast milk.
Doxycycline Counseling:  Patient counseled regarding possible photosensitivity and increased risk for sunburn.  Patient instructed to avoid sunlight, if possible.  When exposed to sunlight, patients should wear protective clothing, sunglasses, and sunscreen.  The patient was instructed to call the office immediately if the following severe adverse effects occur:  hearing changes, easy bruising/bleeding, severe headache, or vision changes.  The patient verbalized understanding of the proper use and possible adverse effects of doxycycline.  All of the patient's questions and concerns were addressed.
Tetracycline Counseling: Patient counseled regarding possible photosensitivity and increased risk for sunburn.  Patient instructed to avoid sunlight, if possible.  When exposed to sunlight, patients should wear protective clothing, sunglasses, and sunscreen.  The patient was instructed to call the office immediately if the following severe adverse effects occur:  hearing changes, easy bruising/bleeding, severe headache, or vision changes.  The patient verbalized understanding of the proper use and possible adverse effects of tetracycline.  All of the patient's questions and concerns were addressed. Patient understands to avoid pregnancy while on therapy due to potential birth defects.
Topical Sulfur Applications Counseling: Topical Sulfur Counseling: Patient counseled that this medication may cause skin irritation or allergic reactions.  In the event of skin irritation, the patient was advised to reduce the amount of the drug applied or use it less frequently.   The patient verbalized understanding of the proper use and possible adverse effects of topical sulfur application.  All of the patient's questions and concerns were addressed.
Isotretinoin Pregnancy And Lactation Text: This medication is Pregnancy Category X and is considered extremely dangerous during pregnancy. It is unknown if it is excreted in breast milk.
Azelaic Acid Pregnancy And Lactation Text: This medication is considered safe during pregnancy and breast feeding.
Birth Control Pills Counseling: Birth Control Pill Counseling: I discussed with the patient the potential side effects of OCPs including but not limited to increased risk of stroke, heart attack, thrombophlebitis, deep venous thrombosis, hepatic adenomas, breast changes, GI upset, headaches, and depression.  The patient verbalized understanding of the proper use and possible adverse effects of OCPs. All of the patient's questions and concerns were addressed.
Sarecycline Counseling: Patient advised regarding possible photosensitivity and discoloration of the teeth, skin, lips, tongue and gums.  Patient instructed to avoid sunlight, if possible.  When exposed to sunlight, patients should wear protective clothing, sunglasses, and sunscreen.  The patient was instructed to call the office immediately if the following severe adverse effects occur:  hearing changes, easy bruising/bleeding, severe headache, or vision changes.  The patient verbalized understanding of the proper use and possible adverse effects of sarecycline.  All of the patient's questions and concerns were addressed.
Tazorac Counseling:  Patient advised that medication is irritating and drying.  Patient may need to apply sparingly and wash off after an hour before eventually leaving it on overnight.  The patient verbalized understanding of the proper use and possible adverse effects of tazorac.  All of the patient's questions and concerns were addressed.
Doxycycline Pregnancy And Lactation Text: This medication is Pregnancy Category D and not consider safe during pregnancy. It is also excreted in breast milk but is considered safe for shorter treatment courses.
Tetracycline Pregnancy And Lactation Text: This medication is Pregnancy Category D and not consider safe during pregnancy. It is also excreted in breast milk.
High Dose Vitamin A Counseling: Side effects reviewed, pt to contact office should one occur.
Azithromycin Counseling:  I discussed with the patient the risks of azithromycin including but not limited to GI upset, allergic reaction, drug rash, diarrhea, and yeast infections.
Birth Control Pills Pregnancy And Lactation Text: This medication should be avoided if pregnant and for the first 30 days post-partum.
Topical Sulfur Applications Pregnancy And Lactation Text: This medication is Pregnancy Category C and has an unknown safety profile during pregnancy. It is unknown if this topical medication is excreted in breast milk.
Benzoyl Peroxide Counseling: Patient counseled that medicine may cause skin irritation and bleach clothing.  In the event of skin irritation, the patient was advised to reduce the amount of the drug applied or use it less frequently.   The patient verbalized understanding of the proper use and possible adverse effects of benzoyl peroxide.  All of the patient's questions and concerns were addressed.
Use Enhanced Medication Counseling?: Yes
Tazorac Pregnancy And Lactation Text: This medication is not safe during pregnancy. It is unknown if this medication is excreted in breast milk.
Aklief counseling:  Patient advised to apply a pea-sized amount only at bedtime and wait 30 minutes after washing their face before applying.  If too drying, patient may add a non-comedogenic moisturizer.  The most commonly reported side effects including irritation, redness, scaling, dryness, stinging, burning, itching, and increased risk of sunburn.  The patient verbalized understanding of the proper use and possible adverse effects of retinoids.  All of the patient's questions and concerns were addressed.
Erythromycin Counseling:  I discussed with the patient the risks of erythromycin including but not limited to GI upset, allergic reaction, drug rash, diarrhea, increase in liver enzymes, and yeast infections.
Azithromycin Pregnancy And Lactation Text: This medication is considered safe during pregnancy and is also secreted in breast milk.
High Dose Vitamin A Pregnancy And Lactation Text: High dose vitamin A therapy is contraindicated during pregnancy and breast feeding.
Winlevi Counseling:  I discussed with the patient the risks of topical clascoterone including but not limited to erythema, scaling, itching, and stinging. Patient voiced their understanding.
Dapsone Counseling: I discussed with the patient the risks of dapsone including but not limited to hemolytic anemia, agranulocytosis, rashes, methemoglobinemia, kidney failure, peripheral neuropathy, headaches, GI upset, and liver toxicity.  Patients who start dapsone require monitoring including baseline LFTs and weekly CBCs for the first month, then every month thereafter.  The patient verbalized understanding of the proper use and possible adverse effects of dapsone.  All of the patient's questions and concerns were addressed.
Detail Level: Zone
Benzoyl Peroxide Pregnancy And Lactation Text: This medication is Pregnancy Category C. It is unknown if benzoyl peroxide is excreted in breast milk.
Spironolactone Counseling: Patient advised regarding risks of diarrhea, abdominal pain, hyperkalemia, birth defects (for female patients), liver toxicity and renal toxicity. The patient may need blood work to monitor liver and kidney function and potassium levels while on therapy. The patient verbalized understanding of the proper use and possible adverse effects of spironolactone.  All of the patient's questions and concerns were addressed.
Topical Clindamycin Counseling: Patient counseled that this medication may cause skin irritation or allergic reactions.  In the event of skin irritation, the patient was advised to reduce the amount of the drug applied or use it less frequently.   The patient verbalized understanding of the proper use and possible adverse effects of clindamycin.  All of the patient's questions and concerns were addressed.
Erythromycin Pregnancy And Lactation Text: This medication is Pregnancy Category B and is considered safe during pregnancy. It is also excreted in breast milk.
Aklief Pregnancy And Lactation Text: It is unknown if this medication is safe to use during pregnancy.  It is unknown if this medication is excreted in breast milk.  Breastfeeding women should use the topical cream on the smallest area of the skin for the shortest time needed while breastfeeding.  Do not apply to nipple and areola.
Bactrim Counseling:  I discussed with the patient the risks of sulfa antibiotics including but not limited to GI upset, allergic reaction, drug rash, diarrhea, dizziness, photosensitivity, and yeast infections.  Rarely, more serious reactions can occur including but not limited to aplastic anemia, agranulocytosis, methemoglobinemia, blood dyscrasias, liver or kidney failure, lung infiltrates or desquamative/blistering drug rashes.
Minocycline Counseling: Patient advised regarding possible photosensitivity and discoloration of the teeth, skin, lips, tongue and gums.  Patient instructed to avoid sunlight, if possible.  When exposed to sunlight, patients should wear protective clothing, sunglasses, and sunscreen.  The patient was instructed to call the office immediately if the following severe adverse effects occur:  hearing changes, easy bruising/bleeding, severe headache, or vision changes.  The patient verbalized understanding of the proper use and possible adverse effects of minocycline.  All of the patient's questions and concerns were addressed.
Dapsone Pregnancy And Lactation Text: This medication is Pregnancy Category C and is not considered safe during pregnancy or breast feeding.
Topical Retinoid counseling:  Patient advised to apply a pea-sized amount only at bedtime and wait 30 minutes after washing their face before applying.  If too drying, patient may add a non-comedogenic moisturizer. The patient verbalized understanding of the proper use and possible adverse effects of retinoids.  All of the patient's questions and concerns were addressed.
Winlevi Pregnancy And Lactation Text: This medication is considered safe during pregnancy and breastfeeding.
Topical Clindamycin Pregnancy And Lactation Text: This medication is Pregnancy Category B and is considered safe during pregnancy. It is unknown if it is excreted in breast milk.
Spironolactone Pregnancy And Lactation Text: This medication can cause feminization of the male fetus and should be avoided during pregnancy. The active metabolite is also found in breast milk.
Isotretinoin Counseling: Patient should get monthly blood tests, not donate blood, not drive at night if vision affected, not share medication, and not undergo elective surgery for 6 months after tx completed. Side effects reviewed, pt to contact office should one occur.
Azelaic Acid Counseling: Patient counseled that medicine may cause skin irritation and to avoid applying near the eyes.  In the event of skin irritation, the patient was advised to reduce the amount of the drug applied or use it less frequently.   The patient verbalized understanding of the proper use and possible adverse effects of azelaic acid.  All of the patient's questions and concerns were addressed.
Include Pregnancy/Lactation Warning?: No
Bactrim Pregnancy And Lactation Text: This medication is Pregnancy Category D and is known to cause fetal risk.  It is also excreted in breast milk.

## 2023-10-02 NOTE — PROCEDURE: PRESCRIPTION MEDICATION MANAGEMENT
Render In Strict Bullet Format?: No
Detail Level: Zone
Plan: Follow up in 2-3 months
Initiate Treatment: -\\n- adapalene 0.3 % topical gel: Mix with clindamycin-benzoyl peroxide gel and apply pea sized amount to entire face nightly. Okay to start applying every 3 nights then increase frequency as tolerated.\\n- clindamycin 1.2 % (1 % base)-benzoyl peroxide 5 % topical gel: Mix with adapalene gel and apply pea sized amount to entire face nightly. Okay to start applying every 3 nights then increase frequency as tolerated.

## 2023-10-02 NOTE — PROCEDURE: DEFER
Instructions (Optional): Patient will RTC if she elects for removal
X Size Of Lesion In Cm (Optional): 0
Detail Level: Detailed
Introduction Text (Please End With A Colon): The following procedure was deferred: LN2
Instructions (Optional): Patient wanted reassurance that lesion is benign. She will RTC if she would like skin tag removed
Detail Level: Simple
Introduction Text (Please End With A Colon): The following procedure was deferred: skin tag removal
Detail Level: Zone
Introduction Text (Please End With A Colon): The following procedure was deferred: cosmetic extractions
Instructions (Optional): Patient will RTC if she decides elects cosmetic treatment

## 2023-10-06 NOTE — ED ADULT NURSE NOTE - AS SC BRADEN ACTIVITY
From: Doreen Yi  To: Mickey Sandhu  Sent: 10/6/2023 3:10 PM CDT  Subject: Neurologist Referral    [facility], I am reaching out today due to concern I called into the nurse triage line last month. I had LOC and was informed by [facility] that I should see a neurologist due to degenerative disc issue in my neck found on a CT scan but also due to passing out and not knowing why. I am not sure if I need to come in and be seen or if able too. I was also told by urgent care I showed see and ENT for inner ear concerns and if it could be related? Thank you!   (4) walks frequently
